# Patient Record
Sex: FEMALE | Race: WHITE | NOT HISPANIC OR LATINO | Employment: FULL TIME | URBAN - METROPOLITAN AREA
[De-identification: names, ages, dates, MRNs, and addresses within clinical notes are randomized per-mention and may not be internally consistent; named-entity substitution may affect disease eponyms.]

---

## 2020-01-23 ENCOUNTER — TELEPHONE (OUTPATIENT)
Dept: INTERNAL MEDICINE | Facility: CLINIC | Age: 30
End: 2020-01-23

## 2020-10-13 LAB
EXTERNAL HEPATITIS B SURFACE ANTIGEN: NEGATIVE
EXTERNAL HEPATITIS C AB: NORMAL
EXTERNAL RUBELLA QUALITATIVE: NORMAL
EXTERNAL SYPHILIS RPR SCREEN: NORMAL

## 2021-04-20 LAB — EXTERNAL GROUP B STREP ANTIGEN: POSITIVE

## 2021-05-13 ENCOUNTER — HOSPITAL ENCOUNTER (INPATIENT)
Facility: HOSPITAL | Age: 31
LOS: 4 days | Discharge: HOME | End: 2021-05-17
Attending: OBSTETRICS & GYNECOLOGY | Admitting: OBSTETRICS & GYNECOLOGY

## 2021-05-13 ENCOUNTER — HOSPITAL ENCOUNTER (OUTPATIENT)
Dept: LABOR AND DELIVERY | Facility: HOSPITAL | Age: 31
Discharge: HOME | End: 2021-05-13

## 2021-05-13 ENCOUNTER — ANESTHESIA EVENT (OUTPATIENT)
Dept: LABOR AND DELIVERY | Facility: HOSPITAL | Age: 31
End: 2021-05-13

## 2021-05-13 ENCOUNTER — ANESTHESIA (OUTPATIENT)
Dept: LABOR AND DELIVERY | Facility: HOSPITAL | Age: 31
End: 2021-05-13

## 2021-05-13 PROBLEM — Z34.90 PREGNANCY: Status: ACTIVE | Noted: 2021-05-13

## 2021-05-13 PROCEDURE — 86900 BLOOD TYPING SEROLOGIC ABO: CPT | Performed by: OBSTETRICS & GYNECOLOGY

## 2021-05-13 PROCEDURE — 86850 RBC ANTIBODY SCREEN: CPT | Performed by: OBSTETRICS & GYNECOLOGY

## 2021-05-13 PROCEDURE — 85027 COMPLETE CBC AUTOMATED: CPT | Performed by: OBSTETRICS & GYNECOLOGY

## 2021-05-13 PROCEDURE — U0003 INFECTIOUS AGENT DETECTION BY NUCLEIC ACID (DNA OR RNA); SEVERE ACUTE RESPIRATORY SYNDROME CORONAVIRUS 2 (SARS-COV-2) (CORONAVIRUS DISEASE [COVID-19]), AMPLIFIED PROBE TECHNIQUE, MAKING USE OF HIGH THROUGHPUT TECHNOLOGIES AS DESCRIBED BY CMS-2020-01-R: HCPCS | Performed by: OBSTETRICS & GYNECOLOGY

## 2021-05-13 PROCEDURE — 86901 BLOOD TYPING SEROLOGIC RH(D): CPT | Performed by: OBSTETRICS & GYNECOLOGY

## 2021-05-13 RX ORDER — MAGNESIUM CARB/ALUMINUM HYDROX 105-160MG
30 TABLET,CHEWABLE ORAL ONCE
Status: DISCONTINUED | OUTPATIENT
Start: 2021-05-14 | End: 2021-05-15 | Stop reason: HOSPADM

## 2021-05-13 RX ORDER — PENICILLIN G 3000000 [IU]/50ML
3 INJECTION, SOLUTION INTRAVENOUS EVERY 4 HOURS
Status: DISCONTINUED | OUTPATIENT
Start: 2021-05-14 | End: 2021-05-15 | Stop reason: HOSPADM

## 2021-05-13 RX ORDER — SODIUM CHLORIDE 0.9 % (FLUSH) 0.9 %
10 SYRINGE (ML) INJECTION AS NEEDED
Status: DISCONTINUED | OUTPATIENT
Start: 2021-05-13 | End: 2021-05-15 | Stop reason: HOSPADM

## 2021-05-13 RX ORDER — OXYTOCIN-SODIUM CHLORIDE 0.9% IV SOLN 30 UNIT/500ML 30-0.9/5 UT/ML-%
2-30 SOLUTION INTRAVENOUS
Status: DISCONTINUED | OUTPATIENT
Start: 2021-05-14 | End: 2021-05-15 | Stop reason: HOSPADM

## 2021-05-13 RX ORDER — PRENATAL VIT NO.126/IRON/FOLIC 28MG-0.8MG
1 TABLET ORAL DAILY
Status: ON HOLD | COMMUNITY

## 2021-05-13 RX ORDER — FAMOTIDINE 10 MG/ML
20 INJECTION, SOLUTION INTRAVENOUS EVERY 12 HOURS PRN
Status: DISCONTINUED | OUTPATIENT
Start: 2021-05-13 | End: 2021-05-15 | Stop reason: HOSPADM

## 2021-05-13 RX ORDER — ONDANSETRON 4 MG/1
4 TABLET, FILM COATED ORAL EVERY 6 HOURS PRN
Status: DISCONTINUED | OUTPATIENT
Start: 2021-05-13 | End: 2021-05-15 | Stop reason: HOSPADM

## 2021-05-13 RX ORDER — SODIUM CHLORIDE, SODIUM LACTATE, POTASSIUM CHLORIDE, CALCIUM CHLORIDE 600; 310; 30; 20 MG/100ML; MG/100ML; MG/100ML; MG/100ML
125 INJECTION, SOLUTION INTRAVENOUS CONTINUOUS
Status: DISCONTINUED | OUTPATIENT
Start: 2021-05-14 | End: 2021-05-15

## 2021-05-13 RX ORDER — FAMOTIDINE 20 MG/1
20 TABLET, FILM COATED ORAL EVERY 12 HOURS PRN
Status: DISCONTINUED | OUTPATIENT
Start: 2021-05-13 | End: 2021-05-15 | Stop reason: HOSPADM

## 2021-05-13 RX ORDER — ONDANSETRON 2 MG/ML
4 INJECTION INTRAMUSCULAR; INTRAVENOUS EVERY 6 HOURS PRN
Status: DISCONTINUED | OUTPATIENT
Start: 2021-05-13 | End: 2021-05-15 | Stop reason: HOSPADM

## 2021-05-13 RX ORDER — TERBUTALINE SULFATE 1 MG/ML
0.25 INJECTION, SOLUTION SUBCUTANEOUS AS NEEDED
Status: DISCONTINUED | OUTPATIENT
Start: 2021-05-13 | End: 2021-05-15 | Stop reason: HOSPADM

## 2021-05-13 RX ADMIN — SODIUM CHLORIDE, POTASSIUM CHLORIDE, SODIUM LACTATE AND CALCIUM CHLORIDE 125 ML/HR: 600; 310; 30; 20 INJECTION, SOLUTION INTRAVENOUS at 23:40

## 2021-05-14 LAB
ABO GROUP BLD: NORMAL
BLD GP AB SCN SERPL QL: NEGATIVE
DEPRECATED RDW RBC AUTO: 44.7 FL (ref 37–54)
ERYTHROCYTE [DISTWIDTH] IN BLOOD BY AUTOMATED COUNT: 12.1 % (ref 12.3–15.4)
HCT VFR BLD AUTO: 33.1 % (ref 34–46.6)
HGB BLD-MCNC: 11.4 G/DL (ref 12–15.9)
MCH RBC QN AUTO: 34.5 PG (ref 26.6–33)
MCHC RBC AUTO-ENTMCNC: 34.4 G/DL (ref 31.5–35.7)
MCV RBC AUTO: 100.3 FL (ref 79–97)
PLATELET # BLD AUTO: 211 10*3/MM3 (ref 140–450)
PMV BLD AUTO: 9.5 FL (ref 6–12)
RBC # BLD AUTO: 3.3 10*6/MM3 (ref 3.77–5.28)
RH BLD: POSITIVE
SARS-COV-2 RNA RESP QL NAA+PROBE: NOT DETECTED
T&S EXPIRATION DATE: NORMAL
WBC # BLD AUTO: 9.99 10*3/MM3 (ref 3.4–10.8)

## 2021-05-14 PROCEDURE — C1755 CATHETER, INTRASPINAL: HCPCS

## 2021-05-14 PROCEDURE — C1755 CATHETER, INTRASPINAL: HCPCS | Performed by: ANESTHESIOLOGY

## 2021-05-14 PROCEDURE — 25010000002 FENTANYL CITRATE (PF) 2500 MCG/50ML SOLUTION: Performed by: ANESTHESIOLOGY

## 2021-05-14 PROCEDURE — 25010000002 PENICILLIN G POTASSIUM PER 600000 UNITS: Performed by: OBSTETRICS & GYNECOLOGY

## 2021-05-14 PROCEDURE — 25010000002 ROPIVACAINE PER 1 MG: Performed by: ANESTHESIOLOGY

## 2021-05-14 PROCEDURE — 25010000002 ROPIVACAINE PER 1 MG: Performed by: STUDENT IN AN ORGANIZED HEALTH CARE EDUCATION/TRAINING PROGRAM

## 2021-05-14 RX ORDER — OXYTOCIN-SODIUM CHLORIDE 0.9% IV SOLN 30 UNIT/500ML 30-0.9/5 UT/ML-%
125 SOLUTION INTRAVENOUS CONTINUOUS PRN
Status: COMPLETED | OUTPATIENT
Start: 2021-05-15 | End: 2021-05-15

## 2021-05-14 RX ORDER — ERYTHROMYCIN 5 MG/G
OINTMENT OPHTHALMIC
Status: DISPENSED
Start: 2021-05-14 | End: 2021-05-15

## 2021-05-14 RX ORDER — PHYTONADIONE 1 MG/.5ML
INJECTION, EMULSION INTRAMUSCULAR; INTRAVENOUS; SUBCUTANEOUS
Status: DISPENSED
Start: 2021-05-14 | End: 2021-05-15

## 2021-05-14 RX ORDER — CARBOPROST TROMETHAMINE 250 UG/ML
250 INJECTION, SOLUTION INTRAMUSCULAR AS NEEDED
Status: DISCONTINUED | OUTPATIENT
Start: 2021-05-14 | End: 2021-05-15 | Stop reason: HOSPADM

## 2021-05-14 RX ORDER — MISOPROSTOL 200 UG/1
800 TABLET ORAL AS NEEDED
Status: DISCONTINUED | OUTPATIENT
Start: 2021-05-14 | End: 2021-05-15 | Stop reason: HOSPADM

## 2021-05-14 RX ORDER — FAMOTIDINE 10 MG/ML
20 INJECTION, SOLUTION INTRAVENOUS ONCE AS NEEDED
Status: DISCONTINUED | OUTPATIENT
Start: 2021-05-14 | End: 2021-05-15 | Stop reason: HOSPADM

## 2021-05-14 RX ORDER — OXYTOCIN-SODIUM CHLORIDE 0.9% IV SOLN 30 UNIT/500ML 30-0.9/5 UT/ML-%
250 SOLUTION INTRAVENOUS CONTINUOUS PRN
Status: ACTIVE | OUTPATIENT
Start: 2021-05-14 | End: 2021-05-15

## 2021-05-14 RX ORDER — OXYTOCIN-SODIUM CHLORIDE 0.9% IV SOLN 30 UNIT/500ML 30-0.9/5 UT/ML-%
999 SOLUTION INTRAVENOUS ONCE
Status: COMPLETED | OUTPATIENT
Start: 2021-05-14 | End: 2021-05-15

## 2021-05-14 RX ORDER — METHYLERGONOVINE MALEATE 0.2 MG/ML
200 INJECTION INTRAVENOUS ONCE AS NEEDED
Status: DISCONTINUED | OUTPATIENT
Start: 2021-05-14 | End: 2021-05-15 | Stop reason: HOSPADM

## 2021-05-14 RX ORDER — EPHEDRINE SULFATE 50 MG/ML
5 INJECTION, SOLUTION INTRAVENOUS AS NEEDED
Status: DISCONTINUED | OUTPATIENT
Start: 2021-05-14 | End: 2021-05-15 | Stop reason: HOSPADM

## 2021-05-14 RX ORDER — DIPHENHYDRAMINE HYDROCHLORIDE 50 MG/ML
12.5 INJECTION INTRAMUSCULAR; INTRAVENOUS EVERY 8 HOURS PRN
Status: DISCONTINUED | OUTPATIENT
Start: 2021-05-14 | End: 2021-05-15 | Stop reason: HOSPADM

## 2021-05-14 RX ORDER — ONDANSETRON 2 MG/ML
4 INJECTION INTRAMUSCULAR; INTRAVENOUS ONCE AS NEEDED
Status: DISCONTINUED | OUTPATIENT
Start: 2021-05-14 | End: 2021-05-15 | Stop reason: HOSPADM

## 2021-05-14 RX ORDER — ROPIVACAINE HYDROCHLORIDE 2 MG/ML
INJECTION, SOLUTION EPIDURAL; INFILTRATION; PERINEURAL AS NEEDED
Status: DISCONTINUED | OUTPATIENT
Start: 2021-05-14 | End: 2021-05-15 | Stop reason: SURG

## 2021-05-14 RX ADMIN — EPHEDRINE SULFATE 5 MG: 50 INJECTION INTRAVENOUS at 15:13

## 2021-05-14 RX ADMIN — SODIUM CHLORIDE, POTASSIUM CHLORIDE, SODIUM LACTATE AND CALCIUM CHLORIDE 999 ML/HR: 600; 310; 30; 20 INJECTION, SOLUTION INTRAVENOUS at 14:02

## 2021-05-14 RX ADMIN — SODIUM CHLORIDE, POTASSIUM CHLORIDE, SODIUM LACTATE AND CALCIUM CHLORIDE 125 ML/HR: 600; 310; 30; 20 INJECTION, SOLUTION INTRAVENOUS at 17:43

## 2021-05-14 RX ADMIN — ROPIVACAINE HYDROCHLORIDE 8 ML: 2 INJECTION, SOLUTION EPIDURAL; INFILTRATION at 14:07

## 2021-05-14 RX ADMIN — PENICILLIN G 3 MILLION UNITS: 3000000 INJECTION, SOLUTION INTRAVENOUS at 20:13

## 2021-05-14 RX ADMIN — EPHEDRINE SULFATE 5 MG: 50 INJECTION INTRAVENOUS at 14:48

## 2021-05-14 RX ADMIN — PENICILLIN G 3 MILLION UNITS: 3000000 INJECTION, SOLUTION INTRAVENOUS at 12:13

## 2021-05-14 RX ADMIN — PENICILLIN G 3 MILLION UNITS: 3000000 INJECTION, SOLUTION INTRAVENOUS at 04:25

## 2021-05-14 RX ADMIN — OXYTOCIN 2 MILLI-UNITS/MIN: 10 INJECTION, SOLUTION INTRAMUSCULAR; INTRAVENOUS at 00:11

## 2021-05-14 RX ADMIN — PENICILLIN G 3 MILLION UNITS: 3000000 INJECTION, SOLUTION INTRAVENOUS at 16:13

## 2021-05-14 RX ADMIN — PENICILLIN G POTASSIUM 5 MILLION UNITS: 5000000 INJECTION, POWDER, FOR SOLUTION INTRAMUSCULAR; INTRAVENOUS at 00:12

## 2021-05-14 RX ADMIN — SODIUM CHLORIDE, POTASSIUM CHLORIDE, SODIUM LACTATE AND CALCIUM CHLORIDE 125 ML/HR: 600; 310; 30; 20 INJECTION, SOLUTION INTRAVENOUS at 08:00

## 2021-05-14 RX ADMIN — PENICILLIN G 3 MILLION UNITS: 3000000 INJECTION, SOLUTION INTRAVENOUS at 08:18

## 2021-05-14 RX ADMIN — FENTANYL CITRATE: 0.05 INJECTION, SOLUTION INTRAMUSCULAR; INTRAVENOUS at 21:49

## 2021-05-14 RX ADMIN — FENTANYL CITRATE 10 ML/HR: 0.05 INJECTION, SOLUTION INTRAMUSCULAR; INTRAVENOUS at 14:07

## 2021-05-15 PROBLEM — Z34.90 PREGNANCY: Status: RESOLVED | Noted: 2021-05-13 | Resolved: 2021-05-15

## 2021-05-15 PROCEDURE — 10907ZC DRAINAGE OF AMNIOTIC FLUID, THERAPEUTIC FROM PRODUCTS OF CONCEPTION, VIA NATURAL OR ARTIFICIAL OPENING: ICD-10-PCS | Performed by: OBSTETRICS & GYNECOLOGY

## 2021-05-15 PROCEDURE — 3E033VJ INTRODUCTION OF OTHER HORMONE INTO PERIPHERAL VEIN, PERCUTANEOUS APPROACH: ICD-10-PCS | Performed by: OBSTETRICS & GYNECOLOGY

## 2021-05-15 PROCEDURE — 0HQ9XZZ REPAIR PERINEUM SKIN, EXTERNAL APPROACH: ICD-10-PCS | Performed by: OBSTETRICS & GYNECOLOGY

## 2021-05-15 RX ORDER — ONDANSETRON 2 MG/ML
4 INJECTION INTRAMUSCULAR; INTRAVENOUS EVERY 6 HOURS PRN
Status: DISCONTINUED | OUTPATIENT
Start: 2021-05-15 | End: 2021-05-17 | Stop reason: HOSPADM

## 2021-05-15 RX ORDER — OXYCODONE AND ACETAMINOPHEN 10; 325 MG/1; MG/1
1 TABLET ORAL EVERY 4 HOURS PRN
Status: DISCONTINUED | OUTPATIENT
Start: 2021-05-15 | End: 2021-05-17 | Stop reason: HOSPADM

## 2021-05-15 RX ORDER — ONDANSETRON 4 MG/1
4 TABLET, FILM COATED ORAL EVERY 8 HOURS PRN
Status: DISCONTINUED | OUTPATIENT
Start: 2021-05-15 | End: 2021-05-17 | Stop reason: HOSPADM

## 2021-05-15 RX ORDER — HYDROXYZINE 50 MG/1
50 TABLET, FILM COATED ORAL NIGHTLY PRN
Status: DISCONTINUED | OUTPATIENT
Start: 2021-05-15 | End: 2021-05-17 | Stop reason: HOSPADM

## 2021-05-15 RX ORDER — DOCUSATE SODIUM 100 MG/1
100 CAPSULE, LIQUID FILLED ORAL 2 TIMES DAILY
Status: DISCONTINUED | OUTPATIENT
Start: 2021-05-15 | End: 2021-05-17 | Stop reason: HOSPADM

## 2021-05-15 RX ORDER — IBUPROFEN 800 MG/1
800 TABLET ORAL EVERY 8 HOURS PRN
Status: DISCONTINUED | OUTPATIENT
Start: 2021-05-15 | End: 2021-05-17 | Stop reason: HOSPADM

## 2021-05-15 RX ORDER — OXYCODONE HYDROCHLORIDE AND ACETAMINOPHEN 5; 325 MG/1; MG/1
1 TABLET ORAL EVERY 4 HOURS PRN
Status: DISCONTINUED | OUTPATIENT
Start: 2021-05-15 | End: 2021-05-17 | Stop reason: HOSPADM

## 2021-05-15 RX ORDER — HYDROCORTISONE 25 MG/G
1 CREAM TOPICAL AS NEEDED
Status: DISCONTINUED | OUTPATIENT
Start: 2021-05-15 | End: 2021-05-17 | Stop reason: HOSPADM

## 2021-05-15 RX ORDER — LANOLIN
CREAM (ML) TOPICAL
Status: DISCONTINUED | OUTPATIENT
Start: 2021-05-15 | End: 2021-05-17 | Stop reason: HOSPADM

## 2021-05-15 RX ORDER — BISACODYL 10 MG
10 SUPPOSITORY, RECTAL RECTAL DAILY PRN
Status: DISCONTINUED | OUTPATIENT
Start: 2021-05-16 | End: 2021-05-17 | Stop reason: HOSPADM

## 2021-05-15 RX ORDER — MISOPROSTOL 200 UG/1
600 TABLET ORAL ONCE AS NEEDED
Status: DISCONTINUED | OUTPATIENT
Start: 2021-05-15 | End: 2021-05-17 | Stop reason: HOSPADM

## 2021-05-15 RX ORDER — CALCIUM CARBONATE 200(500)MG
2 TABLET,CHEWABLE ORAL 3 TIMES DAILY PRN
Status: DISCONTINUED | OUTPATIENT
Start: 2021-05-15 | End: 2021-05-17 | Stop reason: HOSPADM

## 2021-05-15 RX ORDER — PROMETHAZINE HYDROCHLORIDE 12.5 MG/1
12.5 SUPPOSITORY RECTAL EVERY 6 HOURS PRN
Status: DISCONTINUED | OUTPATIENT
Start: 2021-05-15 | End: 2021-05-17 | Stop reason: HOSPADM

## 2021-05-15 RX ORDER — PROMETHAZINE HYDROCHLORIDE 25 MG/1
25 TABLET ORAL EVERY 6 HOURS PRN
Status: DISCONTINUED | OUTPATIENT
Start: 2021-05-15 | End: 2021-05-17 | Stop reason: HOSPADM

## 2021-05-15 RX ADMIN — Medication: at 15:35

## 2021-05-15 RX ADMIN — DOCUSATE SODIUM 100 MG: 100 CAPSULE, LIQUID FILLED ORAL at 21:42

## 2021-05-15 RX ADMIN — OXYTOCIN 999 ML/HR: 10 INJECTION, SOLUTION INTRAMUSCULAR; INTRAVENOUS at 00:40

## 2021-05-15 RX ADMIN — DOCUSATE SODIUM 100 MG: 100 CAPSULE, LIQUID FILLED ORAL at 09:00

## 2021-05-15 RX ADMIN — IBUPROFEN 800 MG: 800 TABLET, FILM COATED ORAL at 02:53

## 2021-05-15 RX ADMIN — OXYTOCIN 125 ML/HR: 10 INJECTION, SOLUTION INTRAMUSCULAR; INTRAVENOUS at 02:25

## 2021-05-15 RX ADMIN — IBUPROFEN 800 MG: 800 TABLET, FILM COATED ORAL at 15:35

## 2021-05-15 NOTE — ANESTHESIA POSTPROCEDURE EVALUATION
"Patient: Marian Saeed    Procedure Summary     Date: 05/14/21 Room / Location:     Anesthesia Start: 1357 Anesthesia Stop: 05/15/21 0037    Procedure: LABOR ANALGESIA Diagnosis:     Scheduled Providers:  Provider: Jana Escobar MD    Anesthesia Type: epidural ASA Status: 2          Anesthesia Type: epidural    Vitals  Vitals Value Taken Time   /66 05/15/21 0445   Temp 36.3 °C (97.4 °F) 05/15/21 0445   Pulse 53 05/15/21 0445   Resp 16 05/15/21 0445   SpO2             Post Anesthesia Care and Evaluation    Patient location during evaluation: bedside  Patient participation: complete - patient participated  Level of consciousness: awake and alert  Pain management: adequate  Airway patency: patent  Anesthetic complications: No anesthetic complications  PONV Status: none  Cardiovascular status: acceptable  Respiratory status: acceptable  Hydration status: acceptable    Comments: /66 (BP Location: Right arm, Patient Position: Sitting)   Pulse 53   Temp 36.3 °C (97.4 °F) (Oral)   Resp 16   Ht 170.2 cm (67\")   Wt 84.2 kg (185 lb 9.6 oz)   SpO2 98%   Breastfeeding Yes   BMI 29.07 kg/m²     No anesthesia care post op    "

## 2021-05-15 NOTE — LACTATION NOTE
P1. Patient has dx of PCOS. Baby has been nursing frequently for good duration per patient , family and clipboard. Baby having frequent stools . Lots of family involvement . FOB supportive. LC number on WB.

## 2021-05-15 NOTE — PLAN OF CARE
Goal Outcome Evaluation:     Progress: improving  Outcome Summary: VSS. Bleeding and fundus WNL. Up to bathroom x1. Breastfeeding well.

## 2021-05-15 NOTE — L&D DELIVERY NOTE
"[facility]  Vaginal Delivery Note    Patient Name: Marian Saeed  :  1990  MRN:  [id number]      Delivery     Delivery: Vaginal, Spontaneous     YOB: 2019    Time of Birth: 12:37 AM      Anesthesia: Epidural     Delivering clinician: Marcela Garcia MD       Infant    Findings: Viable female  infant    Infant observations: Weight: 3609 g (7 lb 15.3 oz)   Observations/Comments:  scale 1      Apgars: 9  @ 1 minute /    9  @ 5 minutes     Placenta, Cord, and Fluid    Placenta delivered  Spontaneous   at  5/15/2021 12:40 AM     Cord: 3 vessels  present.   Cord blood obtained: Yes    Cord gases obtained:  No      Repair    Episiotomy: No   Lacerations: First degree midline    Estimated Blood Loss:  327 mls.          Delivery narrative: The patient is a 30 y.o.  at 39w6d.  Presented for  IOL.  Had slow pit during night.  AROM clear. Epidural given and IUPC placed. Progressed normally to C/C/+1. Labored down an hour. Fetal status reassuring throughout.  of viable female infant  @ 12:37 AM  over an intact perineum. ASDE. 3609 g (7 lb 15.3 oz) .  Placenta delivered spontaneously, intact with 3 vessel cord. Cervix and rectum intact. First degree laceration repaired in usual fashion with 3-0 monocryl suture. Mother and baby recovering good condition. \"Edward\"      Marcela Garcia MD  05/15/21  05:00 EDT    Note written at time of delivery- will refresh for baby data and QBL.  "

## 2021-05-16 LAB
BASOPHILS # BLD AUTO: 0.09 10*3/MM3 (ref 0–0.2)
BASOPHILS NFR BLD AUTO: 0.7 % (ref 0–1.5)
DEPRECATED RDW RBC AUTO: 43.8 FL (ref 37–54)
EOSINOPHIL # BLD AUTO: 0.24 10*3/MM3 (ref 0–0.4)
EOSINOPHIL NFR BLD AUTO: 1.9 % (ref 0.3–6.2)
ERYTHROCYTE [DISTWIDTH] IN BLOOD BY AUTOMATED COUNT: 12.1 % (ref 12.3–15.4)
HCT VFR BLD AUTO: 28.8 % (ref 34–46.6)
HGB BLD-MCNC: 9.7 G/DL (ref 12–15.9)
IMM GRANULOCYTES # BLD AUTO: 0.07 10*3/MM3 (ref 0–0.05)
IMM GRANULOCYTES NFR BLD AUTO: 0.6 % (ref 0–0.5)
LYMPHOCYTES # BLD AUTO: 3.04 10*3/MM3 (ref 0.7–3.1)
LYMPHOCYTES NFR BLD AUTO: 24.4 % (ref 19.6–45.3)
MCH RBC QN AUTO: 33.7 PG (ref 26.6–33)
MCHC RBC AUTO-ENTMCNC: 33.7 G/DL (ref 31.5–35.7)
MCV RBC AUTO: 100 FL (ref 79–97)
MONOCYTES # BLD AUTO: 0.8 10*3/MM3 (ref 0.1–0.9)
MONOCYTES NFR BLD AUTO: 6.4 % (ref 5–12)
NEUTROPHILS NFR BLD AUTO: 66 % (ref 42.7–76)
NEUTROPHILS NFR BLD AUTO: 8.21 10*3/MM3 (ref 1.7–7)
NRBC BLD AUTO-RTO: 0 /100 WBC (ref 0–0.2)
PLATELET # BLD AUTO: 192 10*3/MM3 (ref 140–450)
PMV BLD AUTO: 9.5 FL (ref 6–12)
RBC # BLD AUTO: 2.88 10*6/MM3 (ref 3.77–5.28)
WBC # BLD AUTO: 12.45 10*3/MM3 (ref 3.4–10.8)

## 2021-05-16 PROCEDURE — 85025 COMPLETE CBC W/AUTO DIFF WBC: CPT | Performed by: OBSTETRICS & GYNECOLOGY

## 2021-05-16 RX ADMIN — DOCUSATE SODIUM 100 MG: 100 CAPSULE, LIQUID FILLED ORAL at 20:41

## 2021-05-16 RX ADMIN — IBUPROFEN 800 MG: 800 TABLET, FILM COATED ORAL at 09:26

## 2021-05-16 RX ADMIN — DOCUSATE SODIUM 100 MG: 100 CAPSULE, LIQUID FILLED ORAL at 09:26

## 2021-05-16 RX ADMIN — IBUPROFEN 800 MG: 800 TABLET, FILM COATED ORAL at 00:19

## 2021-05-16 RX ADMIN — BENZOCAINE AND LEVOMENTHOL: 200; 5 SPRAY TOPICAL at 00:19

## 2021-05-16 RX ADMIN — IBUPROFEN 800 MG: 800 TABLET, FILM COATED ORAL at 19:03

## 2021-05-16 NOTE — PLAN OF CARE
Problem: Adult Inpatient Plan of Care  Goal: Plan of Care Review  Outcome: Ongoing, Progressing  Flowsheets (Taken 5/16/2021 0255)  Progress: improving  Plan of Care Reviewed With:   patient   spouse  Outcome Summary: VSS.  Pt ambulating and voiding, WNL.  Breastfeeding.  Pain controlling w/motrin.   Goal Outcome Evaluation:  Plan of Care Reviewed With: patient, spouse  Progress: improving  Outcome Summary: VSS.  Pt ambulating and voiding, WNL.  Breastfeeding.  Pain controlling w/motrin.

## 2021-05-16 NOTE — PROGRESS NOTES
[facility]  Vaginal Delivery Progress Note    Patient Name: Marian Saeed  :  1990  MRN:  [id number]      Subjective   Postpartum Day 1: Vaginal Delivery of a female infant.     The patient feels well without complaints.  Her pain is well controlled.  Reports normal lochia.     The patient plans to breastfeed.    Objective     Vital Signs Range for the last 24 hours  Temperature: Temp:  [97.4 °F (36.3 °C)-99 °F (37.2 °C)] 97.7 °F (36.5 °C)       BP: BP: (101-119)/(65-71) 101/65   Pulse: Heart Rate:  [65-87] 65   Respirations: Resp:  [16-18] 18                       Physical Exam:  General: Awake and alert  Abdomen: Fundus: firm, non tender, 2 below umbilicus  Extremities:  trace edema, NT     Labs:     Results from last 7 days   Lab Units 21  0501 21  2345   WBC 10*3/mm3 12.45* 9.99   HEMOGLOBIN g/dL 9.7* 11.4*   HEMATOCRIT % 28.8* 33.1*   PLATELETS 10*3/mm3 192 211       Prenatal labs results reviewed:  Yes   Rubella: immune  Rh Status:    RH type   Date Value Ref Range Status   2021 Positive  Final         Assessment/Plan  : 1. PPD1 S/P  - Doing well, continue usual cares.           * No active hospital problems. *          Marcela Garcia MD  2021  09:47 EDT

## 2021-05-16 NOTE — LACTATION NOTE
Kristan Leon is a sleepy girl and slightly jaundiced. Patient called  for observation of latch. Baby did take the nipple and got in a few strong sucks but fell asleep and could not be aroused to continue. Practiced hand expression and mom expresses milk easily . Worked on comfortable positioning and  suggested trying side lying position later today. Mom will call. For now drip feeding into baby's mouth .  Lactation Consult Note    Evaluation Completed: 2021 11:52 EDT  Patient Name: Marian Saeed  :  1990  MRN:  [id number]     REFERRAL  INFORMATION:                          Date of Referral: 21   Person Making Referral: patient  Maternal Reason for Referral: breastfeeding currently, no prior breastfeeding experience, other (see comments) (PCOS)  Infant Reason for Referral: hyperbilirubinemia, sleepy    DELIVERY HISTORY:        Skin to skin initiation date/time: 5/15/2021  12:38 AM   Skin to skin end date/time:           MATERNAL ASSESSMENT:  Breast Size Issue: none (21 1145)  Breast Shape: round (21 1145)  Breast Density: filling (21 1145)  Areola: elastic (21 114)  Nipples: everted, graspable (21 114)                INFANT ASSESSMENT:  Information for the patient's :  Justen SaeedFranck [[id number]]   No past medical history on file.                                                                                                     MATERNAL INFANT FEEDING:     Maternal Emotional State: receptive, relaxed (21 1145)  Infant Positioning: cradle (21 1145)   Signs of Milk Transfer: suck/swallow ratio, transfer present (21 114)  Pain with Feeding: yes (21 1145)  Pain Location: breast, right (21 114)  Pain Description: sharp, other (see comments) (brief , with intil latch) (21 114)     Milk Ejection Reflex: present (21 114)           Latch Assistance: verbal guidance offered (21 114)                                EQUIPMENT TYPE:  Breast Pump Type: double electric, personal (05/16/21 8930)                              BREAST PUMPING:          LACTATION REFERRALS:  Lactation Referrals: outpatient lactation program (05/16/21 3869)

## 2021-05-17 VITALS
WEIGHT: 185.6 LBS | SYSTOLIC BLOOD PRESSURE: 112 MMHG | BODY MASS INDEX: 29.13 KG/M2 | DIASTOLIC BLOOD PRESSURE: 74 MMHG | TEMPERATURE: 98.7 F | HEIGHT: 67 IN | HEART RATE: 74 BPM | RESPIRATION RATE: 16 BRPM | OXYGEN SATURATION: 98 %

## 2021-05-17 RX ORDER — IBUPROFEN 800 MG/1
800 TABLET ORAL EVERY 8 HOURS PRN
Qty: 60 TABLET | Refills: 0 | Status: SHIPPED | OUTPATIENT
Start: 2021-05-17 | End: 2023-02-26 | Stop reason: HOSPADM

## 2021-05-17 RX ADMIN — DOCUSATE SODIUM 100 MG: 100 CAPSULE, LIQUID FILLED ORAL at 09:39

## 2021-05-17 RX ADMIN — IBUPROFEN 800 MG: 800 TABLET, FILM COATED ORAL at 10:01

## 2021-05-17 NOTE — DISCHARGE SUMMARY
Date of Discharge:  2021    Discharge Diagnosis: vaginal delivery    Presenting Problem/History of Present Illness  Pregnancy [Z34.90]       Hospital Course  Patient is a 30 y.o. female presented for term IOL.  Delivered viable female infant per Dr. Garcia.  Doing well pp.  Ready for d/c today.      Procedures Performed         Consults:   Consults     No orders found from 2021 to 2021.          Condition on Discharge:   Subjective   Postpartum Day 2 Vaginal Delivery.    The patient feels well without complaints.    Vital Signs  Temp:  [97.8 °F (36.6 °C)-98.7 °F (37.1 °C)] 98.7 °F (37.1 °C)  Heart Rate:  [58-74] 74  Resp:  [16-18] 16  BP: (103-120)/(63-74) 112/74    Physical Exam:   General: Awake and alert   Abdomen: Fundus: firm, non tender    Extremities:  Calves NT bilaterally    Assessment/Plan     PPD2  S/P  -   Stable for discharge. Instructions reviewed      Discharge Disposition  Home or Self Care    Discharge Medications     Discharge Medications      New Medications      Instructions Start Date   ibuprofen 800 MG tablet  Commonly known as: ADVIL,MOTRIN   800 mg, Oral, Every 8 Hours PRN         Continue These Medications      Instructions Start Date   prenatal (CLASSIC) vitamin  tablet  Generic drug: prenatal vitamin   1 tablet, Oral, Daily               The patient has been prescribed a controlled substance.  She has been counseled on the risks associated with using the medication.   The addictive potential of this medication and alternatives were discussed carefully with this patient and she demonstrated understanding.  A NOELLE report has been obtained and reviewed.       Activity at Discharge: restrictions reviewed    Follow-up Appointments  No future appointments.      Test Results Pending at Discharge       Jeanna Linton, GILL  21  09:41 EDT

## 2021-05-17 NOTE — PLAN OF CARE
Goal Outcome Evaluation:  Plan of Care Reviewed With: patient  Progress: improving  Outcome Summary: vss, pain controlled with Motrin, fundus and lochia WNL, voiding spontaneously, ambulate independently, breastfeeding, will continue to monitor.

## 2021-05-20 ENCOUNTER — TELEPHONE (OUTPATIENT)
Dept: LACTATION | Facility: HOSPITAL | Age: 31
End: 2021-05-20

## 2022-08-26 LAB
EXTERNAL HEPATITIS B SURFACE ANTIGEN: NEGATIVE
EXTERNAL HEPATITIS C AB: NORMAL
EXTERNAL RUBELLA QUALITATIVE: NORMAL
EXTERNAL SYPHILIS RPR SCREEN: NORMAL
HIV1 P24 AG SERPL QL IA: NEGATIVE

## 2023-02-26 ENCOUNTER — HOSPITAL ENCOUNTER (EMERGENCY)
Facility: HOSPITAL | Age: 33
Discharge: HOME | End: 2023-02-26
Attending: OBSTETRICS & GYNECOLOGY | Admitting: OBSTETRICS & GYNECOLOGY
Payer: COMMERCIAL

## 2023-02-26 VITALS
SYSTOLIC BLOOD PRESSURE: 118 MMHG | WEIGHT: 169 LBS | HEART RATE: 67 BPM | DIASTOLIC BLOOD PRESSURE: 66 MMHG | OXYGEN SATURATION: 98 % | TEMPERATURE: 97.9 F | BODY MASS INDEX: 26.47 KG/M2 | RESPIRATION RATE: 18 BRPM

## 2023-02-26 LAB
BILIRUB UR QL STRIP: NEGATIVE
CLARITY UR: CLEAR
COLOR UR: YELLOW
GLUCOSE UR STRIP-MCNC: NEGATIVE MG/DL
HGB UR QL STRIP.AUTO: NEGATIVE
KETONES UR QL STRIP: NEGATIVE
LEUKOCYTE ESTERASE UR QL STRIP.AUTO: NEGATIVE
NITRITE UR QL STRIP: NEGATIVE
PH UR STRIP.AUTO: 7 [PH] (ref 5–8)
PROT UR QL STRIP: NEGATIVE
SP GR UR STRIP: <1.005 (ref 1–1.03)
UROBILINOGEN UR QL STRIP: ABNORMAL

## 2023-02-26 PROCEDURE — 81003 URINALYSIS AUTO W/O SCOPE: CPT | Performed by: OBSTETRICS & GYNECOLOGY

## 2023-02-26 PROCEDURE — 59025 FETAL NON-STRESS TEST: CPT

## 2023-02-26 PROCEDURE — 87086 URINE CULTURE/COLONY COUNT: CPT | Performed by: OBSTETRICS & GYNECOLOGY

## 2023-02-26 PROCEDURE — 99283 EMERGENCY DEPT VISIT LOW MDM: CPT | Performed by: OBSTETRICS & GYNECOLOGY

## 2023-02-27 NOTE — NON STRESS TEST
Marian Saeed, a  at 34w3d with an YUNIEL of 2023, by Patient Reported, was seen at [facility] OBSTETRIC EMERGENCY DEPARTMENT for a nonstress test.    Chief Complaint   Patient presents with   • Leg Swelling     Pt reports to [facility] with left leg swelling. Pt noticed swelling around 1900 and states calf felt very hard. Pt elevated leg and states swelling has since gone down slightly. Pt also notes varicose vein on left thigh/around the groin that has been there during the pregnancy but notes that it also appears to be swollen or engorged in appearance. +FM, denies LOF/VB       Patient Active Problem List   Diagnosis   • Migraine with aura and without status migrainosus, not intractable   • Paroxysmal digital cyanosis   • Health care maintenance   • Intermittent palpitations   • Cervicalgia of jptutiag-lriomiv-usvik region   • Cytopenia       Start Time:   Stop Time:     Interpretation A  Nonstress Test Interpretation A: Reactive

## 2023-02-27 NOTE — OBED NOTES
[facility] Provider Note        2023 21:32 DAIANA Saeed is a 32 y.o.  at 34w3d YUNIEL  2023, by Patient Reported who presents for : Leg Swelling (Pt reports to [facility] with left leg swelling. Pt noticed swelling around 1900 and states calf felt very hard. Pt elevated leg and states swelling has since gone down slightly. Pt also notes varicose vein on left thigh/around the groin that has been there during the pregnancy but notes that it also appears to be swollen or engorged in appearance. +FM, denies LOF/VB)          HPI: Patient noticed earlier today that her left calf was swollen and appeared larger than her right.  Her ankles were never swollen.  Since arriving she at the [facility] she now feels that her legs look the same again and the swelling has resolved.    Active fetus Yes   denies ROM  deniescontractions, cramping   denies bleeding    Prenatal care with Babs Roberts MD uncomplicated    Patient Active Problem List    Diagnosis    • Cytopenia [D75.9]    • Migraine with aura and without status migrainosus, not intractable [G43.109]    • Paroxysmal digital cyanosis [I73.00]    • Health care maintenance [Z00.00]    • Intermittent palpitations [R00.2]    • Cervicalgia of blrupoed-slygyey-dxtwq region [M54.2]          POB:   OB History    Para Term  AB Living   2 1 1 0 0 1   SAB IAB Ectopic Molar Multiple Live Births   0 0 0 0 0 1      # Outcome Date GA Lbr Rohit/2nd Weight Sex Delivery Anes PTL Lv   2 Current            1 Term 05/15/21 39w6d 20:05 / 01:27 3609 g (7 lb 15.3 oz) F Vag-Spont EPI N ROSALBA      Birth Comments: scale 1      Name: LOUIS SAEED      Apgar1: 9  Apgar5: 9      PMH:   Past Medical History:   Diagnosis Date   • Migraine    • PCOS (polycystic ovarian syndrome)    • Urticaria, dermatographic      PSH:   Past Surgical History:   Procedure Laterality Date   • KNEE SURGERY      LEFT KNEE  DR. FARNSWORTH   • WISDOM TOOTH EXTRACTION       FH:    Family  "History   Problem Relation Age of Onset   • Thyroid disease Mother    • Migraines Mother    • Hypertension Father    • Atrial fibrillation Father    • Diabetes Paternal Grandmother      SH:   Social History     Tobacco Use   • Smoking status: Never   Substance Use Topics   • Alcohol use: Not Currently   • Drug use: Never       Allergies: Patient has no known allergies.    Medications:   Medications Prior to Admission   Medication Sig Dispense Refill Last Dose   • prenatal vitamin (prenatal, CLASSIC, vitamin) tablet Take 1 tablet by mouth Daily.   2/25/2023   • ibuprofen (ADVIL,MOTRIN) 800 MG tablet Take 1 tablet by mouth Every 8 (Eight) Hours As Needed for Mild Pain . 60 tablet 0        Review of Systems  A 14 system review was completed and negative except for what is noted in the HPI or below  Pertinent items are noted in HPI      Physical exam    Temp:  [97.9 °F (36.6 °C)] 97.9 °F (36.6 °C)  Heart Rate:  [67] 67  Resp:  [18] 18  BP: (118)/(66) 118/66  Estimated body mass index is 26.47 kg/m² as calculated from the following:    Height as of 5/13/21: 170.2 cm (67\").    Weight as of this encounter: 76.7 kg (169 lb).    General appearance - alert, well appearing, and in no distress  Extremities - no edema, redness or tenderness in the calves or thighs           Membranes: Intact                      FHR: Category 1 tracing, reactive NST  CTX: rare     U/S reviewed: not performed.       External Prenatal Results     Pregnancy Outside Results - Transcribed From Office Records - See Scanned Records For Details     Test Value Date Time    ABO  A  05/13/21 2345    Rh  Positive  05/13/21 2345    Antibody Screen  Negative  05/13/21 2345    Varicella IgG       Rubella ^ Immune  10/13/20     Hgb  9.7 g/dL 05/16/21 0501    Hct  28.8 % 05/16/21 0501    Glucose Fasting GTT       Glucose Tolerance Test 1 hour       Glucose Tolerance Test 3 hour       Gonorrhea (discrete)       Chlamydia (discrete)       RPR ^ Non-Reactive  " 10/13/20     VDRL       Syphilis Antibody       HBsAg ^ Negative  10/13/20     Herpes Simplex Virus PCR       Herpes Simplex VIrus Culture       HIV       Hep C RNA Quant PCR       Hep C Antibody ^ Non- Reactive  10/13/20     AFP       Group B Strep ^ Positive  21     GBS Susceptibility to Clindamycin       GBS Susceptibility to Erythromycin       Fetal Fibronectin       Genetic Testing, Maternal Blood             Drug Screening     Test Value Date Time    Urine Drug Screen       Amphetamine Screen       Barbiturate Screen       Benzodiazepine Screen       Methadone Screen       Phencyclidine Screen       Opiates Screen       THC Screen       Cocaine Screen       Propoxyphene Screen       Buprenorphine Screen       Methamphetamine Screen       Oxycodone Screen       Tricyclic Antidepressants Screen             Legend    ^: Historical                          Impression:   @ 34w3d .  Final Diagnosis: edema in left lower extremity, now resolved    Plan:  1. Discharge home.  Patient advised that if she continues to have unilateral edema she should call office or return to [facility] for ultrasound.  2. Plan of care has been reviewed with patient  3.  Risks, benefits of treatment plan have been discussed.  4.  All questions have been answered.  5.  Keep scheduled prenatal appointment with outpatient Ob provider       I discussed the patients findings and my recommendations with patient      Kelley Monreal MD  2023  21:32 EST

## 2023-02-28 LAB — BACTERIA SPEC AEROBE CULT: NO GROWTH

## 2023-03-15 LAB — EXTERNAL GROUP B STREP ANTIGEN: NEGATIVE

## 2023-04-05 ENCOUNTER — ANESTHESIA EVENT (OUTPATIENT)
Dept: LABOR AND DELIVERY | Facility: HOSPITAL | Age: 33
End: 2023-04-05
Payer: COMMERCIAL

## 2023-04-05 ENCOUNTER — HOSPITAL ENCOUNTER (INPATIENT)
Facility: HOSPITAL | Age: 33
LOS: 2 days | Discharge: HOME | End: 2023-04-07
Attending: OBSTETRICS & GYNECOLOGY | Admitting: OBSTETRICS & GYNECOLOGY
Payer: COMMERCIAL

## 2023-04-05 ENCOUNTER — HOSPITAL ENCOUNTER (OUTPATIENT)
Dept: LABOR AND DELIVERY | Facility: HOSPITAL | Age: 33
Discharge: HOME | End: 2023-04-05
Payer: COMMERCIAL

## 2023-04-05 ENCOUNTER — ANESTHESIA (OUTPATIENT)
Dept: LABOR AND DELIVERY | Facility: HOSPITAL | Age: 33
End: 2023-04-05
Payer: COMMERCIAL

## 2023-04-05 DIAGNOSIS — Z34.90 PREGNANCY, UNSPECIFIED GESTATIONAL AGE: ICD-10-CM

## 2023-04-05 LAB
ABO GROUP BLD: NORMAL
BLD GP AB SCN SERPL QL: NEGATIVE
DEPRECATED RDW RBC AUTO: 44.3 FL (ref 37–54)
ERYTHROCYTE [DISTWIDTH] IN BLOOD BY AUTOMATED COUNT: 12.1 % (ref 12.3–15.4)
HCT VFR BLD AUTO: 35.3 % (ref 34–46.6)
HGB BLD-MCNC: 11.9 G/DL (ref 12–15.9)
MCH RBC QN AUTO: 33.6 PG (ref 26.6–33)
MCHC RBC AUTO-ENTMCNC: 33.7 G/DL (ref 31.5–35.7)
MCV RBC AUTO: 99.7 FL (ref 79–97)
PLATELET # BLD AUTO: 196 10*3/MM3 (ref 140–450)
PMV BLD AUTO: 9.7 FL (ref 6–12)
RBC # BLD AUTO: 3.54 10*6/MM3 (ref 3.77–5.28)
RH BLD: POSITIVE
T&S EXPIRATION DATE: NORMAL
WBC NRBC COR # BLD: 9.63 10*3/MM3 (ref 3.4–10.8)

## 2023-04-05 PROCEDURE — 85027 COMPLETE CBC AUTOMATED: CPT | Performed by: OBSTETRICS & GYNECOLOGY

## 2023-04-05 PROCEDURE — 0 LIDOCAINE 1 % SOLUTION: Performed by: ANESTHESIOLOGY

## 2023-04-05 PROCEDURE — 86901 BLOOD TYPING SEROLOGIC RH(D): CPT | Performed by: OBSTETRICS & GYNECOLOGY

## 2023-04-05 PROCEDURE — 86900 BLOOD TYPING SEROLOGIC ABO: CPT | Performed by: OBSTETRICS & GYNECOLOGY

## 2023-04-05 PROCEDURE — 86850 RBC ANTIBODY SCREEN: CPT | Performed by: OBSTETRICS & GYNECOLOGY

## 2023-04-05 PROCEDURE — C1755 CATHETER, INTRASPINAL: HCPCS | Performed by: ANESTHESIOLOGY

## 2023-04-05 RX ORDER — FAMOTIDINE 10 MG/ML
20 INJECTION, SOLUTION INTRAVENOUS ONCE AS NEEDED
Status: COMPLETED | OUTPATIENT
Start: 2023-04-05 | End: 2023-04-06

## 2023-04-05 RX ORDER — FENTANYL CIT 0.2 MG/100ML-ROPIV 0.2%-NACL 0.9% EPIDURAL INJ 2/0.2 MCG/ML-%
10 SOLUTION INJECTION CONTINUOUS
Status: DISCONTINUED | OUTPATIENT
Start: 2023-04-05 | End: 2023-04-06

## 2023-04-05 RX ORDER — LIDOCAINE HYDROCHLORIDE 10 MG/ML
INJECTION, SOLUTION INFILTRATION; PERINEURAL AS NEEDED
Status: DISCONTINUED | OUTPATIENT
Start: 2023-04-05 | End: 2023-04-06 | Stop reason: SURG

## 2023-04-05 RX ORDER — SODIUM CHLORIDE, SODIUM LACTATE, POTASSIUM CHLORIDE, CALCIUM CHLORIDE 600; 310; 30; 20 MG/100ML; MG/100ML; MG/100ML; MG/100ML
125 INJECTION, SOLUTION INTRAVENOUS CONTINUOUS
Status: DISCONTINUED | OUTPATIENT
Start: 2023-04-05 | End: 2023-04-06

## 2023-04-05 RX ORDER — MAGNESIUM CARB/ALUMINUM HYDROX 105-160MG
30 TABLET,CHEWABLE ORAL ONCE
Status: COMPLETED | OUTPATIENT
Start: 2023-04-05 | End: 2023-04-06

## 2023-04-05 RX ORDER — EPHEDRINE SULFATE 50 MG/ML
5 INJECTION, SOLUTION INTRAVENOUS
Status: DISCONTINUED | OUTPATIENT
Start: 2023-04-05 | End: 2023-04-06 | Stop reason: HOSPADM

## 2023-04-05 RX ORDER — DIPHENHYDRAMINE HYDROCHLORIDE 50 MG/ML
12.5 INJECTION INTRAMUSCULAR; INTRAVENOUS EVERY 8 HOURS PRN
Status: DISCONTINUED | OUTPATIENT
Start: 2023-04-05 | End: 2023-04-06 | Stop reason: HOSPADM

## 2023-04-05 RX ORDER — ONDANSETRON 2 MG/ML
4 INJECTION INTRAMUSCULAR; INTRAVENOUS ONCE AS NEEDED
Status: DISCONTINUED | OUTPATIENT
Start: 2023-04-05 | End: 2023-04-06 | Stop reason: HOSPADM

## 2023-04-05 RX ORDER — LIDOCAINE HYDROCHLORIDE AND EPINEPHRINE 15; 5 MG/ML; UG/ML
INJECTION, SOLUTION EPIDURAL AS NEEDED
Status: DISCONTINUED | OUTPATIENT
Start: 2023-04-05 | End: 2023-04-06 | Stop reason: SURG

## 2023-04-05 RX ORDER — SODIUM CHLORIDE 0.9 % (FLUSH) 0.9 %
10 SYRINGE (ML) INJECTION EVERY 12 HOURS SCHEDULED
Status: DISCONTINUED | OUTPATIENT
Start: 2023-04-05 | End: 2023-04-06 | Stop reason: HOSPADM

## 2023-04-05 RX ORDER — SODIUM CHLORIDE 0.9 % (FLUSH) 0.9 %
10 SYRINGE (ML) INJECTION AS NEEDED
Status: DISCONTINUED | OUTPATIENT
Start: 2023-04-05 | End: 2023-04-06 | Stop reason: HOSPADM

## 2023-04-05 RX ORDER — LIDOCAINE HYDROCHLORIDE 10 MG/ML
5 INJECTION, SOLUTION EPIDURAL; INFILTRATION; INTRACAUDAL; PERINEURAL AS NEEDED
Status: DISCONTINUED | OUTPATIENT
Start: 2023-04-05 | End: 2023-04-06 | Stop reason: HOSPADM

## 2023-04-05 RX ORDER — OXYTOCIN/0.9 % SODIUM CHLORIDE 30/500 ML
2-20 PLASTIC BAG, INJECTION (ML) INTRAVENOUS
Status: DISCONTINUED | OUTPATIENT
Start: 2023-04-05 | End: 2023-04-06 | Stop reason: HOSPADM

## 2023-04-05 RX ORDER — TERBUTALINE SULFATE 1 MG/ML
0.25 INJECTION, SOLUTION SUBCUTANEOUS AS NEEDED
Status: DISCONTINUED | OUTPATIENT
Start: 2023-04-05 | End: 2023-04-06 | Stop reason: HOSPADM

## 2023-04-05 RX ORDER — BUTORPHANOL TARTRATE 1 MG/ML
1 INJECTION, SOLUTION INTRAMUSCULAR; INTRAVENOUS
Status: DISCONTINUED | OUTPATIENT
Start: 2023-04-05 | End: 2023-04-06 | Stop reason: HOSPADM

## 2023-04-05 RX ADMIN — SODIUM CHLORIDE, POTASSIUM CHLORIDE, SODIUM LACTATE AND CALCIUM CHLORIDE 125 ML/HR: 600; 310; 30; 20 INJECTION, SOLUTION INTRAVENOUS at 16:01

## 2023-04-05 RX ADMIN — Medication 12 ML/HR: at 21:26

## 2023-04-05 RX ADMIN — SODIUM CHLORIDE, POTASSIUM CHLORIDE, SODIUM LACTATE AND CALCIUM CHLORIDE 999 ML/HR: 600; 310; 30; 20 INJECTION, SOLUTION INTRAVENOUS at 21:19

## 2023-04-05 RX ADMIN — LIDOCAINE HYDROCHLORIDE 5 ML: 10 INJECTION, SOLUTION INFILTRATION; PERINEURAL at 21:23

## 2023-04-05 RX ADMIN — LIDOCAINE HYDROCHLORIDE AND EPINEPHRINE 3 ML: 15; 5 INJECTION, SOLUTION EPIDURAL at 21:19

## 2023-04-05 RX ADMIN — Medication 2 MILLI-UNITS/MIN: at 16:42

## 2023-04-05 NOTE — H&P
.[facility]  Obstetric History and Physical    No chief complaint on file.      Subjective     Patient is a 32 y.o. female  currently at 39w6d risk reducing pit induction.            PROBLEM LIST    Pregnancy      Past OB History:       OB History    Para Term  AB Living   2 1 1 0 0 1   SAB IAB Ectopic Molar Multiple Live Births   0 0 0 0 0 1      # Outcome Date GA Lbr Rohit/2nd Weight Sex Delivery Anes PTL Lv   2 Current            1 Term 05/15/21 39w6d 20:05 / :27 3609 g (7 lb 15.3 oz) F Vag-Spont EPI N ROSALBA      Birth Comments: scale 1      Name: LOUIS RENAE      Apgar1: 9  Apgar5: 9       Past Medical History: Past Medical History:   Diagnosis Date   • Migraine    • PCOS (polycystic ovarian syndrome)    • Urticaria, dermatographic       Past Surgical History Past Surgical History:   Procedure Laterality Date   • KNEE SURGERY      LEFT KNEE  DR. FARNSWORTH   • WISDOM TOOTH EXTRACTION        Family History: Family History   Problem Relation Age of Onset   • Thyroid disease Mother    • Migraines Mother    • Hypertension Father    • Atrial fibrillation Father    • Diabetes Paternal Grandmother       Social History:  reports that she has never smoked. She does not have any smokeless tobacco history on file.   reports that she does not currently use alcohol.   reports no history of drug use.    Allergies:     Patient has no known allergies.       Objective       Vital Signs Range for the last 24 hours  Temperature: Temp:  [98.1 °F (36.7 °C)-98.4 °F (36.9 °C)] 98.1 °F (36.7 °C)   Temp Source: Temp src: Oral   BP: BP: (103-117)/(65-86) 103/65   Pulse: Heart Rate:  [57-74] 57   Respirations: Resp:  [16-18] 16                   Physical Examination:     General :  Alert in NAD  Abdomen: Gravid, nontender        Cervix: Exam by: Method: sterile exam per physician   Dilation: Cervical Dilation (cm): 3   Effacement: Cervical Effacement: 60%   Station: Fetal Station: -2       Fetal Heart Rate  Assessment   Method: Fetal HR Assessment Method: external   Beats/min: Fetal HR (beats/min): 120   Baseline: Fetal HR Baseline: normal range   Varibility: Fetal HR Variability: moderate (amplitude range 6 to 25 bpm)   Accels: Fetal HR Accelerations: greater than/equal to 15 bpm, lasting at least 15 seconds   Decels: Fetal HR Decelerations: absent   Tracing Category:       Uterine Assessment   Method: Method: external tocotransducer   Frequency (min): Contraction Frequency (Minutes): 0   Ctx Count in 10 min:     Duration:     Intensity:     Intensity by IUPC:     Resting Tone:     Resting Tone by IUPC:     [place] Units:         cvx- 3/60/-2. AROM        Assessment & Plan       Assessment:  1.  Intrauterine pregnancy at 39w6d weeks gestation with reassuring fetal status.    2.  Risk reducing pit induction    Plan:   Plan of care has been reviewed with patient and family,.   All questions answered.          Office prenatal reviewed        Jenelle Rae MD  4/5/2023  19:11 EDT

## 2023-04-06 LAB
ATMOSPHERIC PRESS: 751.6 MMHG
BASE EXCESS BLDCOA CALC-SCNC: -1 MMOL/L (ref -2–2)
BDY SITE: ABNORMAL
HCO3 BLDCOA-SCNC: 27.2 MMOL/L (ref 22–28)
INHALED O2 CONCENTRATION: 21 %
MODALITY: ABNORMAL
NOTE: ABNORMAL
PCO2 BLDCOA: 59.7 MMHG (ref 43–63)
PH BLDCOA: 7.27 PH UNITS (ref 7.18–7.34)
PO2 BLDCOA: 22.1 MMHG (ref 12–26)
SAO2 % BLDCOA: 29.2 % (ref 92–99)
VENTILATOR MODE: ABNORMAL

## 2023-04-06 PROCEDURE — 25010000002 METHYLERGONOVINE MALEATE PER 0.2 MG

## 2023-04-06 PROCEDURE — 82803 BLOOD GASES ANY COMBINATION: CPT

## 2023-04-06 PROCEDURE — 3E033VJ INTRODUCTION OF OTHER HORMONE INTO PERIPHERAL VEIN, PERCUTANEOUS APPROACH: ICD-10-PCS | Performed by: OBSTETRICS & GYNECOLOGY

## 2023-04-06 RX ORDER — IBUPROFEN 600 MG/1
600 TABLET ORAL EVERY 6 HOURS PRN
Status: DISCONTINUED | OUTPATIENT
Start: 2023-04-06 | End: 2023-04-06 | Stop reason: HOSPADM

## 2023-04-06 RX ORDER — HYDROCODONE BITARTRATE AND ACETAMINOPHEN 10; 325 MG/1; MG/1
1 TABLET ORAL EVERY 4 HOURS PRN
Status: DISCONTINUED | OUTPATIENT
Start: 2023-04-06 | End: 2023-04-07 | Stop reason: HOSPADM

## 2023-04-06 RX ORDER — MISOPROSTOL 200 UG/1
TABLET ORAL
Status: COMPLETED
Start: 2023-04-06 | End: 2023-04-06

## 2023-04-06 RX ORDER — HYDROXYZINE 50 MG/1
50 TABLET, FILM COATED ORAL NIGHTLY PRN
Status: DISCONTINUED | OUTPATIENT
Start: 2023-04-06 | End: 2023-04-07 | Stop reason: HOSPADM

## 2023-04-06 RX ORDER — HYDROCORTISONE 25 MG/G
1 CREAM TOPICAL AS NEEDED
Status: DISCONTINUED | OUTPATIENT
Start: 2023-04-06 | End: 2023-04-07 | Stop reason: HOSPADM

## 2023-04-06 RX ORDER — BISACODYL 10 MG
10 SUPPOSITORY, RECTAL RECTAL DAILY PRN
Status: DISCONTINUED | OUTPATIENT
Start: 2023-04-07 | End: 2023-04-07 | Stop reason: HOSPADM

## 2023-04-06 RX ORDER — PROMETHAZINE HYDROCHLORIDE 25 MG/1
25 TABLET ORAL EVERY 6 HOURS PRN
Status: DISCONTINUED | OUTPATIENT
Start: 2023-04-06 | End: 2023-04-07 | Stop reason: HOSPADM

## 2023-04-06 RX ORDER — METHYLERGONOVINE MALEATE 0.2 MG/ML
INJECTION INTRAVENOUS
Status: COMPLETED
Start: 2023-04-06 | End: 2023-04-06

## 2023-04-06 RX ORDER — ACETAMINOPHEN 325 MG/1
650 TABLET ORAL EVERY 6 HOURS PRN
Status: DISCONTINUED | OUTPATIENT
Start: 2023-04-06 | End: 2023-04-07 | Stop reason: HOSPADM

## 2023-04-06 RX ORDER — OXYTOCIN/0.9 % SODIUM CHLORIDE 30/500 ML
999 PLASTIC BAG, INJECTION (ML) INTRAVENOUS ONCE
Status: COMPLETED | OUTPATIENT
Start: 2023-04-06 | End: 2023-04-06

## 2023-04-06 RX ORDER — ONDANSETRON 2 MG/ML
4 INJECTION INTRAMUSCULAR; INTRAVENOUS EVERY 6 HOURS PRN
Status: DISCONTINUED | OUTPATIENT
Start: 2023-04-06 | End: 2023-04-07 | Stop reason: HOSPADM

## 2023-04-06 RX ORDER — ONDANSETRON 4 MG/1
4 TABLET, FILM COATED ORAL EVERY 8 HOURS PRN
Status: DISCONTINUED | OUTPATIENT
Start: 2023-04-06 | End: 2023-04-07 | Stop reason: HOSPADM

## 2023-04-06 RX ORDER — IBUPROFEN 600 MG/1
600 TABLET ORAL EVERY 6 HOURS PRN
Status: DISCONTINUED | OUTPATIENT
Start: 2023-04-06 | End: 2023-04-07 | Stop reason: HOSPADM

## 2023-04-06 RX ORDER — CALCIUM CARBONATE 200(500)MG
2 TABLET,CHEWABLE ORAL 3 TIMES DAILY PRN
Status: DISCONTINUED | OUTPATIENT
Start: 2023-04-06 | End: 2023-04-07 | Stop reason: HOSPADM

## 2023-04-06 RX ORDER — MISOPROSTOL 200 UG/1
600 TABLET ORAL ONCE AS NEEDED
Status: DISCONTINUED | OUTPATIENT
Start: 2023-04-06 | End: 2023-04-07 | Stop reason: HOSPADM

## 2023-04-06 RX ORDER — OXYTOCIN/0.9 % SODIUM CHLORIDE 30/500 ML
250 PLASTIC BAG, INJECTION (ML) INTRAVENOUS CONTINUOUS
Status: DISCONTINUED | OUTPATIENT
Start: 2023-04-06 | End: 2023-04-06

## 2023-04-06 RX ORDER — PROMETHAZINE HYDROCHLORIDE 12.5 MG/1
12.5 SUPPOSITORY RECTAL EVERY 6 HOURS PRN
Status: DISCONTINUED | OUTPATIENT
Start: 2023-04-06 | End: 2023-04-07 | Stop reason: HOSPADM

## 2023-04-06 RX ORDER — DOCUSATE SODIUM 100 MG/1
100 CAPSULE, LIQUID FILLED ORAL 2 TIMES DAILY
Status: DISCONTINUED | OUTPATIENT
Start: 2023-04-06 | End: 2023-04-07 | Stop reason: HOSPADM

## 2023-04-06 RX ORDER — ACETAMINOPHEN 325 MG/1
650 TABLET ORAL EVERY 4 HOURS PRN
Status: DISCONTINUED | OUTPATIENT
Start: 2023-04-06 | End: 2023-04-06 | Stop reason: HOSPADM

## 2023-04-06 RX ORDER — HYDROCODONE BITARTRATE AND ACETAMINOPHEN 5; 325 MG/1; MG/1
1 TABLET ORAL EVERY 4 HOURS PRN
Status: DISCONTINUED | OUTPATIENT
Start: 2023-04-06 | End: 2023-04-07 | Stop reason: HOSPADM

## 2023-04-06 RX ADMIN — IBUPROFEN 600 MG: 600 TABLET, FILM COATED ORAL at 08:10

## 2023-04-06 RX ADMIN — DOCUSATE SODIUM 100 MG: 100 CAPSULE, LIQUID FILLED ORAL at 20:20

## 2023-04-06 RX ADMIN — IBUPROFEN 600 MG: 600 TABLET, FILM COATED ORAL at 20:20

## 2023-04-06 RX ADMIN — Medication 250 ML/HR: at 01:45

## 2023-04-06 RX ADMIN — MINERAL OIL 30 ML: 1000 SOLUTION ORAL at 01:15

## 2023-04-06 RX ADMIN — METHYLERGONOVINE MALEATE 200 MCG: 0.2 INJECTION INTRAVENOUS at 01:35

## 2023-04-06 RX ADMIN — Medication 999 ML/HR: at 01:23

## 2023-04-06 RX ADMIN — IBUPROFEN 600 MG: 600 TABLET, FILM COATED ORAL at 02:37

## 2023-04-06 RX ADMIN — MISOPROSTOL 800 MCG: 200 TABLET ORAL at 01:35

## 2023-04-06 RX ADMIN — SODIUM CHLORIDE, POTASSIUM CHLORIDE, SODIUM LACTATE AND CALCIUM CHLORIDE 125 ML/HR: 600; 310; 30; 20 INJECTION, SOLUTION INTRAVENOUS at 00:39

## 2023-04-06 RX ADMIN — ACETAMINOPHEN 650 MG: 325 TABLET ORAL at 02:37

## 2023-04-06 RX ADMIN — DOCUSATE SODIUM 100 MG: 100 CAPSULE, LIQUID FILLED ORAL at 08:10

## 2023-04-06 RX ADMIN — FAMOTIDINE 20 MG: 10 INJECTION INTRAVENOUS at 00:55

## 2023-04-06 RX ADMIN — IBUPROFEN 600 MG: 600 TABLET, FILM COATED ORAL at 14:12

## 2023-04-06 NOTE — ANESTHESIA POSTPROCEDURE EVALUATION
Patient: Marian Saeed    Procedure Summary     Date: 04/05/23 Room / Location:     Anesthesia Start: 2112 Anesthesia Stop: 04/06/23 0121    Procedure: LABOR ANALGESIA Diagnosis:     Scheduled Providers:  Provider: Isidro Bourne MD    Anesthesia Type: epidural ASA Status: 2          Anesthesia Type: epidural    Vitals  Vitals Value Taken Time   /91 04/06/23 0200   Temp 36.8 °C (98.2 °F) 04/06/23 0130   Pulse 80 04/06/23 0200   Resp 16 04/06/23 0200   SpO2 98 % 04/06/23 0115   Vitals shown include unvalidated device data.        Post Anesthesia Care and Evaluation      Comments: An anesthesiologist was immediately available throughout the labor and delivery period.

## 2023-04-06 NOTE — L&D DELIVERY NOTE
[facility]  Vaginal Delivery Note    Delivery    Marian Saeed 32 y.o.  at 40w0d    Induction: Oxytocin   Induction indication:  Risk reducing  Labor onset:2023  5:53 PM   Dilation complete: 2023  1:13 AM   Beginning of second stage: 2023  1:17 AM     Antibiotics received during labor: No            Delivery: Vaginal, Spontaneous     YOB: 2021    Time of Birth: 1:21 AM      Anesthesia: Epidural     Delivering clinician: Jenelle Rae MD       Infant    Findings: Viable female  infant    Infant observations: Weight: 3185 g (7 lb 0.4 oz)    Observations/Comments:  infant scale #1      Apgars: 8  @ 1 minute /    9  @ 5 minutes     Placenta, Cord, and Fluid    Placenta delivered  Spontaneous   at  2023  1:25 AM     Cord: 3 vessels  present.   Cord blood obtained: Yes    Cord gases obtained:  Yes      Repair    Episiotomy: none   Lacerations: none   Estimated Blood Loss:  mls.       Delivery narrative: The patient is a 32 y.o.  at 40w0d. Risk reducing pit induction. epidudral Membrane rupture/fluid: artificial rupture of membranes;Intact  at 5:53 PM  on 2023  Clear  She progressed appropriately in labor. FHR were overall reassuring without persistent worrisome decelerations.  SheProgressed to complete at 1:13 AM . Labored down until 2023  1:17 AM . She pushed couple contractions and had a   of a  3185 g (7 lb 0.4 oz)  female   infant   8  @ 1 minute /   9  @ 5 minutes. The right shoulder was the anterior shoulder and easily delivered. Arterial was pH sent.    Placenta was spontaneously delivered,3 vessel cord, intact. . Cervix and rectum intact. There was no laceration. QBL was 163  mls. There were no complications. Mother and baby doing well at time of dictation.    Seemed to be mildly boggy that would firm up good with fundal massage. Gave cytotec 800mcg and methergine more prophylactically.          Pregnancy      Jenelle Rae MD  23  09:28  EDT    Delivery note completed now- 1:47 AM EDT  too soon after delivery that nursing info not yet entered. Refreshing later so missing delivery demographics recorded.

## 2023-04-06 NOTE — ANESTHESIA PREPROCEDURE EVALUATION
Anesthesia Evaluation                  Airway   Mallampati: II  TM distance: >3 FB  Neck ROM: full  Dental - normal exam     Pulmonary - normal exam   Cardiovascular - normal exam        Neuro/Psych  GI/Hepatic/Renal/Endo      Musculoskeletal     Abdominal    Substance History      OB/GYN    (+) Pregnant,         Other                          Anesthesia Plan    ASA 2     epidural       Anesthetic plan, risks, benefits, and alternatives have been provided, discussed and informed consent has been obtained with: patient and spouse/significant other.

## 2023-04-06 NOTE — PLAN OF CARE
Goal Outcome Evaluation:  Plan of Care Reviewed With: patient, spouse        Progress: improving  Outcome Evaluation: 5/80/-1, anticipate vaginal delivery

## 2023-04-06 NOTE — ANESTHESIA PROCEDURE NOTES
Labor Epidural      Patient reassessed immediately prior to procedure    Patient location during procedure: OB  Performed By  Anesthesiologist: Isidro Bourne MD  Preanesthetic Checklist  Completed: patient identified, IV checked, site marked, risks and benefits discussed, surgical consent, monitors and equipment checked, pre-op evaluation and timeout performed  Additional Notes  Test dose 3 cc 1.5% lidocaine with epi.  Second dose 5cc 1% lidocaine then continuous infusion o.2% Ropivicaine with 2 Mics fentanyl per cc at 12 cc hr, with  6cc PCA, 15 min lockout  Prep:  Pt Position:sitting  Sterile Tech:gloves, cap, mask and sterile barrier  Prep:chlorhexidine gluconate and isopropyl alcohol  Monitoring:blood pressure monitoring, continuous pulse oximetry and EKG  Epidural Block Procedure:  Approach:midline  Guidance:landmark technique  Location:L4-L5  Needle Type:Tuohy  Needle Gauge:18  Loss of Resistance Medium: air  Paresthesia: none  Aspiration:negative  Test Dose:negative  Number of Attempts: 1  Post Assessment:  Dressing:occlusive dressing applied and secured with tape  Pt Tolerance:patient tolerated the procedure well with no apparent complications  Complications:no

## 2023-04-06 NOTE — LACTATION NOTE
P2T. Baby girl nursing well at left breast in cradle hold. Patient denies discomfort and nursed first baby with no problems . Has PBP. LC # on WB.  Lactation Consult Note    Evaluation Completed: 2023 11:00 EDT  Patient Name: Marian Saeed  :  1990  MRN:  [id number]     REFERRAL  INFORMATION:                          Date of Referral: 23   Person Making Referral: lactation consultant  Maternal Reason for Referral: breastfeeding currently       DELIVERY HISTORY:        Skin to skin initiation date/time: 2023  1:22 AM   Skin to skin end date/time: 2023  2:50 AM        MATERNAL ASSESSMENT:  Breast Size Issue: none (23 1000)  Breast Shape: round, pendulous (23 1000)  Breast Density: soft (23 1000)     Nipples: everted (23 1000)     Left Nipple Symptoms: intact (23 1000)  Right Nipple Symptoms: intact (23 1000)       INFANT ASSESSMENT:  Information for the patient's :  Felicita Saeed [[id number]]   No past medical history on file.                                                                                                     MATERNAL INFANT FEEDING:     Maternal Emotional State: receptive, relaxed (23 1000)  Infant Positioning: cradle (23 1000)   Signs of Milk Transfer: suck/swallow ratio, transfer present (23 1000)  Pain with Feeding: no (23 1000)                       Latch Assistance: none needed (23 1000)                               EQUIPMENT TYPE:  Breast Pump Type: double electric, personal (23 1000)                              BREAST PUMPING:          LACTATION REFERRALS:

## 2023-04-07 VITALS
DIASTOLIC BLOOD PRESSURE: 63 MMHG | TEMPERATURE: 97.9 F | HEIGHT: 67 IN | RESPIRATION RATE: 16 BRPM | WEIGHT: 169 LBS | OXYGEN SATURATION: 99 % | BODY MASS INDEX: 26.53 KG/M2 | HEART RATE: 44 BPM | SYSTOLIC BLOOD PRESSURE: 107 MMHG

## 2023-04-07 LAB
BASOPHILS # BLD AUTO: 0.09 10*3/MM3 (ref 0–0.2)
BASOPHILS NFR BLD AUTO: 1.3 % (ref 0–1.5)
DEPRECATED RDW RBC AUTO: 42.6 FL (ref 37–54)
EOSINOPHIL # BLD AUTO: 0.17 10*3/MM3 (ref 0–0.4)
EOSINOPHIL NFR BLD AUTO: 2.4 % (ref 0.3–6.2)
ERYTHROCYTE [DISTWIDTH] IN BLOOD BY AUTOMATED COUNT: 11.8 % (ref 12.3–15.4)
HCT VFR BLD AUTO: 30.3 % (ref 34–46.6)
HGB BLD-MCNC: 10.2 G/DL (ref 12–15.9)
IMM GRANULOCYTES # BLD AUTO: 0.01 10*3/MM3 (ref 0–0.05)
IMM GRANULOCYTES NFR BLD AUTO: 0.1 % (ref 0–0.5)
LYMPHOCYTES # BLD AUTO: 3.08 10*3/MM3 (ref 0.7–3.1)
LYMPHOCYTES NFR BLD AUTO: 44.2 % (ref 19.6–45.3)
MCH RBC QN AUTO: 34 PG (ref 26.6–33)
MCHC RBC AUTO-ENTMCNC: 33.7 G/DL (ref 31.5–35.7)
MCV RBC AUTO: 101 FL (ref 79–97)
MONOCYTES # BLD AUTO: 0.47 10*3/MM3 (ref 0.1–0.9)
MONOCYTES NFR BLD AUTO: 6.7 % (ref 5–12)
NEUTROPHILS NFR BLD AUTO: 3.15 10*3/MM3 (ref 1.7–7)
NEUTROPHILS NFR BLD AUTO: 45.3 % (ref 42.7–76)
NRBC BLD AUTO-RTO: 0 /100 WBC (ref 0–0.2)
PLATELET # BLD AUTO: 157 10*3/MM3 (ref 140–450)
PMV BLD AUTO: 10.3 FL (ref 6–12)
RBC # BLD AUTO: 3 10*6/MM3 (ref 3.77–5.28)
WBC NRBC COR # BLD: 6.97 10*3/MM3 (ref 3.4–10.8)

## 2023-04-07 PROCEDURE — 85025 COMPLETE CBC W/AUTO DIFF WBC: CPT | Performed by: OBSTETRICS & GYNECOLOGY

## 2023-04-07 RX ORDER — IBUPROFEN 600 MG/1
600 TABLET ORAL EVERY 8 HOURS PRN
Qty: 40 TABLET | Refills: 0 | Status: SHIPPED | OUTPATIENT
Start: 2023-04-07

## 2023-04-07 RX ADMIN — IBUPROFEN 600 MG: 600 TABLET, FILM COATED ORAL at 05:29

## 2023-04-07 RX ADMIN — DOCUSATE SODIUM 100 MG: 100 CAPSULE, LIQUID FILLED ORAL at 08:39

## 2023-04-07 NOTE — DISCHARGE SUMMARY
[facility]  Vaginal Delivery Progress Note    Subjective   Postpartum Day 1Vaginal Delivery.    The patient feels well without complaints. Ready to go home.    Wants to go home today.    Nl lochia  Just wants motrin    Objective     Vital Signs Range for the last 24 hours  Temperature: Temp:  [97.1 °F (36.2 °C)-97.9 °F (36.6 °C)] 97.9 °F (36.6 °C)       BP: BP: ()/(52-68) 107/63   Pulse: Heart Rate:  [44-54] 44   Respirations: Resp:  [16-18] 16                       Physical Exam:  General: Awake and alert  Abdomen: Fundus: firm, non tender, and below umbilicus  Extremities:  Calves NT bilaterally        Assessment & Plan     PPD2  S/P  - routine care. Discharge home. Instructions reviewed.   Rx sent/on chart.  Follow up in 6 weeks.     PROBLEM LIST    Pregnancy        Jenelle Rae MD  2023  11:41 EDT

## 2023-04-07 NOTE — PAYOR COMM NOTE
"Christophe Marian (32 y.o. Female)     [facility]    [address] [place] [address]  Facility NPI: [id number]    Dhaval Lyons  Fax: [phone]  Phone: [phone] (Muna: 3365)    Subject: ADMISSION NOTIFICATION AUTH CLINICALS   Reference #: [id number]  Please don't hesitate to contact me with any questions or concerns.          Date of Birth   1990    Social Security Number       [place]   [address] [place] [address] [facility] [address]    Home Phone   [phone]    MRN   [id number]       Islam   None    Marital Status                               Admission Date   4/5/23    Admission Type   Elective    Admitting Provider   Jenelle Rae MD    Attending Provider   Babs Roberts MD    Department, Room/Bed   Psychiatric [facility], S308/1       Discharge Date       Discharge Disposition       Discharge Destination                               Attending Provider: Babs Roberts MD    Allergies: No Known Allergies    Isolation: None   Infection: None   Code Status: CPR    Ht: 170.2 cm (67\")   Wt: 76.7 kg (169 lb)    Admission Cmt: None   Principal Problem: None                Active Insurance as of 4/5/2023     Primary Coverage     Payor Plan Insurance Group Employer/Plan Group    ANTHEM BLUE CROSS ANTHEM BLUE CROSS BLUE SHIELD PPO [id number]     Payor Plan Address Payor Plan Phone Number Payor Plan Fax Number Effective Dates    [address] [phone]  1/1/2021 - None Entered    [facility] [id number]       Subscriber Name Subscriber Birth Date Member ID       JAYDEN SAEED 2/2/1993 [id number]                 Emergency Contacts      (Rel.) Home Phone Work Phone Mobile Phone    ALE NAVAS (Mother) [phone] -- --    Christophe Adalberto (Spouse) -- -- [phone]            Insurance Information                ANTHEM BLUE CROSS/ANTHEM BLUE CROSS BLUE SHIELD PPO Phone: [phone]    Subscriber: Jayden Saeed Subscriber#: [id number] "    Group#: [id number] Precert#: --             History & Physical      Jenelle Rae MD at 23          .[facility]  Obstetric History and Physical    No chief complaint on file.      Subjective      Patient is a 32 y.o. female  currently at 39w6d risk reducing pit induction.            PROBLEM LIST    Pregnancy      Past OB History:       OB History    Para Term  AB Living   2 1 1 0 0 1   SAB IAB Ectopic Molar Multiple Live Births   0 0 0 0 0 1      # Outcome Date GA Lbr Rohit/2nd Weight Sex Delivery Anes PTL Lv   2 Current            1 Term 05/15/21 39w6d 20:05  01:27 3609 g (7 lb 15.3 oz) F Vag-Spont EPI N ROSALBA      Birth Comments: scale 1      Name: LOUIS RENAE      Apgar1: 9  Apgar5: 9       Past Medical History: Past Medical History:   Diagnosis Date   • Migraine    • PCOS (polycystic ovarian syndrome)    • Urticaria, dermatographic       Past Surgical History Past Surgical History:   Procedure Laterality Date   • KNEE SURGERY      LEFT KNEE  DR. FARNSWORTH   • WISDOM TOOTH EXTRACTION        Family History: Family History   Problem Relation Age of Onset   • Thyroid disease Mother    • Migraines Mother    • Hypertension Father    • Atrial fibrillation Father    • Diabetes Paternal Grandmother       Social History:  reports that she has never smoked. She does not have any smokeless tobacco history on file.   reports that she does not currently use alcohol.   reports no history of drug use.    Allergies:     Patient has no known allergies.       Objective        Vital Signs Range for the last 24 hours  Temperature: Temp:  [98.1 °F (36.7 °C)-98.4 °F (36.9 °C)] 98.1 °F (36.7 °C)   Temp Source: Temp src: Oral   BP: BP: (103-117)/(65-86) 103/65   Pulse: Heart Rate:  [57-74] 57   Respirations: Resp:  [16-18] 16                   Physical Examination:     General :  Alert in NAD  Abdomen: Gravid, nontender        Cervix: Exam by: Method: sterile exam per physician   Dilation:  Cervical Dilation (cm): 3   Effacement: Cervical Effacement: 60%   Station: Fetal Station: -2       Fetal Heart Rate Assessment   Method: Fetal HR Assessment Method: external   Beats/min: Fetal HR (beats/min): 120   Baseline: Fetal HR Baseline: normal range   Varibility: Fetal HR Variability: moderate (amplitude range 6 to 25 bpm)   Accels: Fetal HR Accelerations: greater than/equal to 15 bpm, lasting at least 15 seconds   Decels: Fetal HR Decelerations: absent   Tracing Category:       Uterine Assessment   Method: Method: external tocotransducer   Frequency (min): Contraction Frequency (Minutes): 0   Ctx Count in 10 min:     Duration:     Intensity:     Intensity by IUPC:     Resting Tone:     Resting Tone by IUPC:     [place] Units:         cvx- 3/60/-2. AROM        Assessment & Plan       Assessment:  1.  Intrauterine pregnancy at 39w6d weeks gestation with reassuring fetal status.    2.  Risk reducing pit induction    Plan:   Plan of care has been reviewed with patient and family,.   All questions answered.          Office prenatal reviewed        Jenelle Rae MD  4/5/2023  19:11 EDT        Electronically signed by Jenelle Rae MD at 04/05/23 1912         Facility-Administered Medications as of 4/7/2023   Medication Dose Route Frequency Provider Last Rate Last Admin   • acetaminophen (TYLENOL) tablet 650 mg  650 mg Oral Q6H PRN Jenelle Rae MD       • benzocaine (AMERICAINE) 20 % rectal ointment 1 application  1 application Rectal PRN Jenelle Rae MD       • benzocaine-menthol (DERMOPLAST) 20-0.5 % topical spray   Topical PRN Jenelle Rae MD       • bisacodyl (DULCOLAX) suppository 10 mg  10 mg Rectal Daily PRN Jenelle Rae MD       • calcium carbonate (TUMS) chewable tablet 500 mg (200 mg elemental)  2 tablet Oral TID PRN Jenelle Rae MD       • docusate sodium (COLACE) capsule 100 mg  100 mg Oral BID Jenelle Rae MD   100 mg at 04/06/23 2020   • [COMPLETED] famotidine (PEPCID)  injection 20 mg  20 mg Intravenous Once PRN Andrea Junior MD   20 mg at 04/06/23 0055   • HYDROcodone-acetaminophen (NORCO) 5-325 MG per tablet 1 tablet  1 tablet Oral Q4H PRN Jenelle Rae MD        Or   • HYDROcodone-acetaminophen (NORCO)  MG per tablet 1 tablet  1 tablet Oral Q4H PRN KyraJenelle can MD       • Hydrocort-Pramoxine (Perianal) (PROCTOFOAM-HS) 1-1 % rectal foam 1 application  1 application Topical PRN KyraJenelle can MD       • Hydrocortisone (Perianal) (ANUSOL-HC) 2.5 % rectal cream 1 application  1 application Rectal PRN KyraJenelle can MD       • hydrOXYzine (ATARAX) tablet 50 mg  50 mg Oral Nightly PRN KyraJenelle can MD       • ibuprofen (ADVIL,MOTRIN) tablet 600 mg  600 mg Oral Q6H PRN KyraJenelle can MD   600 mg at 04/07/23 0529   • magnesium hydroxide (MILK OF MAGNESIA) suspension 10 mL  10 mL Oral Daily PRN KyraJenelle can MD       • [COMPLETED] methylergonovine (METHERGINE) 0.2 MG/ML injection  - ADS Override Pull        200 mcg at 04/06/23 0135   • [COMPLETED] mineral oil liquid 30 mL  30 mL Topical Once Jenelle Rae MD   30 mL at 04/06/23 0115   • [COMPLETED] miSOPROStol (CYTOTEC) 200 MCG tablet  - ADS Override Pull        800 mcg at 04/06/23 0135   • miSOPROStol (CYTOTEC) tablet 600 mcg  600 mcg Oral Once PRN Jenelle Rae MD       • ondansetron (ZOFRAN) injection 4 mg  4 mg Intravenous Q6H PRN Jenelle Rae MD       • ondansetron (ZOFRAN) tablet 4 mg  4 mg Oral Q8H PRN KyraJenelle can MD       • [COMPLETED] oxytocin (PITOCIN) 30 units in 0.9% sodium chloride 500 mL (premix)  999 mL/hr Intravenous Once Jenelle Rae  mL/hr at 04/06/23 0123 999 mL/hr at 04/06/23 0123   • promethazine (PHENERGAN) tablet 25 mg  25 mg Oral Q6H PRN Jenelle Rae MD        Or   • promethazine (PHENERGAN) suppository 12.5 mg  12.5 mg Rectal Q6H PRN Jenelle Rae MD       • sertraline (ZOLOFT) tablet 50 mg  50 mg Oral Daily Jenelle Rae MD         Lab Results (last 72 hours)      Procedure Component Value Units Date/Time    CBC & Differential [[id number]]  (Abnormal) Collected: 04/07/23 0712    Specimen: Blood Updated: 04/07/23 0731    Narrative:      The following orders were created for panel order CBC & Differential.  Procedure                               Abnormality         Status                     ---------                               -----------         ------                     CBC Auto Differential[[id number]]        Abnormal            Final result                 Please view results for these tests on the individual orders.    CBC Auto Differential [[id number]]  (Abnormal) Collected: 04/07/23 0712    Specimen: Blood Updated: 04/07/23 0731     WBC 6.97 10*3/mm3      RBC 3.00 10*6/mm3      Hemoglobin 10.2 g/dL      Hematocrit 30.3 %      .0 fL      MCH 34.0 pg      MCHC 33.7 g/dL      RDW 11.8 %      RDW-SD 42.6 fl      MPV 10.3 fL      Platelets 157 10*3/mm3      Neutrophil % 45.3 %      Lymphocyte % 44.2 %      Monocyte % 6.7 %      Eosinophil % 2.4 %      Basophil % 1.3 %      Immature Grans % 0.1 %      Neutrophils, Absolute 3.15 10*3/mm3      Lymphocytes, Absolute 3.08 10*3/mm3      Monocytes, Absolute 0.47 10*3/mm3      Eosinophils, Absolute 0.17 10*3/mm3      Basophils, Absolute 0.09 10*3/mm3      Immature Grans, Absolute 0.01 10*3/mm3      nRBC 0.0 /100 WBC     Blood Gas, Arterial, Cord [[id number]]  (Abnormal) Collected: 04/06/23 0135    Specimen: Cord Blood Arterial from Umbilical Cord Updated: 04/06/23 0137     Site N/A     Comment: N/A        pH, Cord Arterial 7.27 pH Units      pCO2, Cord Arterial 59.7 mmHg      pO2, Cord Arterial 22.1 mmHg      HCO3, Cord Arterial 27.2 mmol/L      Base Exc, Cord Arterial -1.0 mmol/L      Barometric Pressure for Blood Gas 751.6 mmHg      Modality Room Air     FIO2 21 %      Ventilator Mode na     O2 Saturation Calculated 29.2 %      Comment: drawn by [id number] @ 0121 Meter: [id number] : [id number] Cindy Bazzi        Note  --    CBC (No Diff) [[id number]]  (Abnormal) Collected: 04/05/23 1602    Specimen: Blood Updated: 04/05/23 1613     WBC 9.63 10*3/mm3      RBC 3.54 10*6/mm3      Hemoglobin 11.9 g/dL      Hematocrit 35.3 %      MCV 99.7 fL      MCH 33.6 pg      MCHC 33.7 g/dL      RDW 12.1 %      RDW-SD 44.3 fl      MPV 9.7 fL      Platelets 196 10*3/mm3     Group B Streptococcus Culture - Swab, Vaginal/Rectum [[id number]] Resulted: 03/15/23    Specimen: Swab from Vaginal/Rectum Updated: 04/05/23 1606     External Strep Group B Ag Negative    Hepatitis C Antibody [[id number]] Resulted: 08/26/22    Specimen: Blood Updated: 04/05/23 1606     External Hepatitis C Ab non-reactive    Hepatitis B Surface Antigen [[id number]] Resulted: 08/26/22    Specimen: Blood Updated: 04/05/23 1606     External Hepatitis B Surface Ag Negative    RPR [[id number]] Resulted: 08/26/22    Specimen: Blood Updated: 04/05/23 1606     External RPR Non-Reactive    Rubella Antibody, IgG [[id number]] Resulted: 08/26/22    Specimen: Blood Updated: 04/05/23 1606     External Rubella Qual Immune    HIV-1 Antibody, EIA [[id number]] Resulted: 08/26/22    Specimen: Blood Updated: 04/05/23 1606     External HIV Antibody Negative          Imaging Results (Last 72 Hours)     ** No results found for the last 72 hours. **        ECG/EMG Results (last 72 hours)     ** No results found for the last 72 hours. **        Orders (last 72 hrs)      Start     Ordered    04/07/23 0800  Sitz Bath  3 Times Daily        Comments: PRN    04/06/23 0410    04/07/23 0000  CBC & Differential  Morning Draw        Comments: Lab to draw      04/06/23 0410    04/07/23 0000  bisacodyl (DULCOLAX) suppository 10 mg  Daily PRN         04/06/23 0410    04/07/23 0000  CBC Auto Differential  PROCEDURE ONCE        Comments: Lab to draw      04/06/23 1601    04/06/23 0900  sertraline (ZOLOFT) tablet 50 mg  Daily         04/06/23 0410    04/06/23 0900  docusate sodium (COLACE) capsule 100 mg  2 Times Daily          04/06/23 0410    04/06/23 0411  Transfer to postpartum when discharge criteria met.  Until Discontinued         04/06/23 0410    04/06/23 0411  Transfer Patient  Once         04/06/23 0410    04/06/23 0411  Code Status and Medical Interventions:  Continuous         04/06/23 0410    04/06/23 0411  Vital Signs Per hospital policy  Per Hospital Policy         04/06/23 0410    04/06/23 0411  Notify Physician  Until Discontinued         04/06/23 0410    04/06/23 0411  Up Ad Yahaira  Until Discontinued         04/06/23 0410    04/06/23 0411  Ambulate Patient  Every Shift       04/06/23 0410    04/06/23 0411  Patient May Shower  Once         04/06/23 0410    04/06/23 0411  Advance Diet As Tolerated -Regular  Until Discontinued         04/06/23 0410    04/06/23 0411  Fundal and Lochia Check  Per Hospital Policy        Comments: Q 15 min x 4, Q 30 min x 2, then Q Shift    04/06/23 0410    04/06/23 0411  RN to Assess Rh Status & Place RhIG Evaluation Order if Indicated  Continuous         04/06/23 0410    04/06/23 0411  Bladder Assessment  Per Order Details        Comments: Postpartum 1) Upon Admission to Unit & Every 4 Hours PRN Until Voiding. 2) Out of Bed to Void in 8 Hours.    04/06/23 0410    04/06/23 0411  Straight Cath  Per Order Details        Comments: Postpartum: If Distended & Unable to Void, May Repeat Once.    04/06/23 0410    04/06/23 0411  Indwelling Urinary Catheter  Per Order Details        Comments: Postpartum : After Straight Cathed x2 or if Greater Than 1000mL Residual, Insert Indwelling Urinary Catheter Until Further MD Order.    04/06/23 0410    04/06/23 0411  Remove Vaginal Packing  Once         04/06/23 0410    04/06/23 0411  Breast pump to bed  Once         04/06/23 0410    04/06/23 0411  If indicated -- Please administer RH Immunoglobulin based on results of cord blood evaluation and fetal screen lab tests, pharmacy to dispense  Per Order Details        Comments: See Process Instructions For Reference  Range Details.    04/06/23 0410    04/06/23 0411  VTE Prophylaxis Not Indicated: No Risk Factors (0); </= 3 (Low Risk)  Once         04/06/23 0410    04/06/23 0410  ibuprofen (ADVIL,MOTRIN) tablet 600 mg  Every 6 Hours PRN         04/06/23 0410    04/06/23 0410  acetaminophen (TYLENOL) tablet 650 mg  Every 6 Hours PRN         04/06/23 0410    04/06/23 0410  HYDROcodone-acetaminophen (NORCO) 5-325 MG per tablet 1 tablet  Every 4 Hours PRN        See Hyperspace for full Linked Orders Report.    04/06/23 0410    04/06/23 0410  HYDROcodone-acetaminophen (NORCO)  MG per tablet 1 tablet  Every 4 Hours PRN        See Hyperspace for full Linked Orders Report.    04/06/23 0410    04/06/23 0410  miSOPROStol (CYTOTEC) tablet 600 mcg  Once As Needed         04/06/23 0410    04/06/23 0410  hydrOXYzine (ATARAX) tablet 50 mg  Nightly PRN         04/06/23 0410    04/06/23 0410  magnesium hydroxide (MILK OF MAGNESIA) suspension 10 mL  Daily PRN         04/06/23 0410    04/06/23 0410  benzocaine-menthol (DERMOPLAST) 20-0.5 % topical spray  As Needed         04/06/23 0410    04/06/23 0410  Hydrocort-Pramoxine (Perianal) (PROCTOFOAM-HS) 1-1 % rectal foam 1 application  As Needed         04/06/23 0410    04/06/23 0410  Hydrocortisone (Perianal) (ANUSOL-HC) 2.5 % rectal cream 1 application  As Needed         04/06/23 0410    04/06/23 0410  benzocaine (AMERICAINE) 20 % rectal ointment 1 application  As Needed         04/06/23 0410    04/06/23 0410  ondansetron (ZOFRAN) tablet 4 mg  Every 8 Hours PRN         04/06/23 0410    04/06/23 0410  ondansetron (ZOFRAN) injection 4 mg  Every 6 Hours PRN         04/06/23 0410    04/06/23 0410  promethazine (PHENERGAN) tablet 25 mg  Every 6 Hours PRN        See Hyperspace for full Linked Orders Report.    04/06/23 0410    04/06/23 0410  promethazine (PHENERGAN) suppository 12.5 mg  Every 6 Hours PRN        See Hyperspace for full Linked Orders Report.    04/06/23 0410    04/06/23 0410  calcium  carbonate (TUMS) chewable tablet 500 mg (200 mg elemental)  3 Times Daily PRN         04/06/23 0410    04/06/23 0223  acetaminophen (TYLENOL) tablet 650 mg  Every 4 Hours PRN,   Status:  Discontinued         04/06/23 0223    04/06/23 0223  ibuprofen (ADVIL,MOTRIN) tablet 600 mg  Every 6 Hours PRN,   Status:  Discontinued         04/06/23 0223    04/06/23 0156  Blood Gas, Arterial -  STAT         04/06/23 0155    04/06/23 0138  Blood Gas, Arterial, Cord  PROCEDURE ONCE         04/06/23 0135 04/06/23 0132  miSOPROStol (CYTOTEC) 200 MCG tablet  - ADS Override Pull        Note to Pharmacy: Created by cabinet override    04/06/23 0132    04/06/23 0132  methylergonovine (METHERGINE) 0.2 MG/ML injection  - ADS Override Pull        Note to Pharmacy: Created by cabinet override    04/06/23 0132    04/06/23 0130  oxytocin (PITOCIN) 30 units in 0.9% sodium chloride 500 mL (premix)  Continuous,   Status:  Discontinued        See Hyperspace for full Linked Orders Report.    04/06/23 0023    04/06/23 0115  oxytocin (PITOCIN) 30 units in 0.9% sodium chloride 500 mL (premix)  Once        See Hyperspace for full Linked Orders Report.    04/06/23 0023    04/05/23 2100  sodium chloride 0.9 % flush 10 mL  Every 12 Hours Scheduled,   Status:  Discontinued         04/05/23 1541    04/05/23 1745  fentaNYL 2mcg/mL and ropivacaine 0.2% in NS epidural 100mL  Continuous,   Status:  Discontinued         04/05/23 1657    04/05/23 1658  Vital Signs Per Anesthesia Guidelines  Continuous,   Status:  Canceled        Comments: Every 2 Minutes x5, Every 5 Minutes x4, then if Stable Every 15 Minutes    04/05/23 1657    04/05/23 1658  Start IV with #16 or #18 gauge angiocath.  Once,   Status:  Canceled         04/05/23 1657    04/05/23 1658  Cardiac Monitoring  Continuous,   Status:  Canceled         04/05/23 1657    04/05/23 1658  Fetal Heart Rate Monitor  Once,   Status:  Canceled         04/05/23 1657    04/05/23 1658  Nurse or anesthesiologist  to remain with patient for 15 minutes following dosing.  Until Discontinued,   Status:  Canceled         04/05/23 1657    04/05/23 1658  Facilitate maternal postion on side and maintain uterine displacement.  Until Discontinued,   Status:  Canceled         04/05/23 1657    04/05/23 1658  Consult anesthesia services prior to changing epidural infusion/rate.  Until Discontinued,   Status:  Canceled         04/05/23 1657    04/05/23 1658  Nurse may remove epidural catheter after delivery.  Until Discontinued,   Status:  Canceled         04/05/23 1657    04/05/23 1658  Insert Indwelling Urinary Catheter  Once,   Status:  Canceled        See Hyperspace for full Linked Orders Report.    04/05/23 1657 04/05/23 1658  Assess Need for Indwelling Urinary Catheter - Follow Removal Protocol  Continuous,   Status:  Canceled        Comments: Indwelling Urinary Catheter Removal Criteria  Discontinue Indwelling Urinary Catheter Unless One of the Following is Present:  Urinary Retention or Obstruction  Chronic Urinary Catheter Use  End of Life  Critical Illness with Strict I/O   Tract or Abdominal Surgery  Stage 3/4 Sacral / Perineal Wound  Required Activity Restriction: Trauma  Required Activity Restriction: Spine Surgery  If Patient is Being Followed by Urology Contact Them PRIOR to Removal  Do Not Remove Indwelling Urinary Catheter Order is Present with a CLINICAL REASON to Maintain the Catheter. Provider is Required to Include a Clinical Reason to Maintain a Urinary Catheter    Patient Admitted With Indwelling Urinary Catheter (Not Placed at [facility])  Assess for Continued Need & Document Medical Necessity  If Infection is Suspected, Contact the Provider       See Hyperspace for full Linked Orders Report.    04/05/23 1657 04/05/23 1658  Urinary Catheter Care  Every Shift,   Status:  Canceled      See Hyperspace for full Linked Orders Report.    04/05/23 1657 04/05/23 1658  Notify physician for the following  conditions:  Until Discontinued,   Status:  Canceled         04/05/23 1657    04/05/23 1657  ePHEDrine injection 5 mg  Every 15 Minutes PRN,   Status:  Discontinued         04/05/23 1657    04/05/23 1657  diphenhydrAMINE (BENADRYL) injection 12.5 mg  Every 8 Hours PRN,   Status:  Discontinued         04/05/23 1657    04/05/23 1657  ondansetron (ZOFRAN) injection 4 mg  Once As Needed,   Status:  Discontinued         04/05/23 1657    04/05/23 1657  famotidine (PEPCID) injection 20 mg  Once As Needed         04/05/23 1657    04/05/23 1630  lactated ringers infusion  Continuous,   Status:  Discontinued         04/05/23 1541    04/05/23 1630  mineral oil liquid 30 mL  Once         04/05/23 1541    04/05/23 1630  oxytocin (PITOCIN) 30 units in 0.9% sodium chloride 500 mL (premix)  Titrated,   Status:  Discontinued         04/05/23 1541    04/05/23 1542  Insert Peripheral IV  Once,   Status:  Canceled         04/05/23 1541    04/05/23 1542  Saline Lock & Maintain IV Access  Continuous,   Status:  Canceled         04/05/23 1541    04/05/23 1541  butorphanol (STADOL) injection 1 mg  Every 3 Hours PRN,   Status:  Discontinued         04/05/23 1541    04/05/23 1541  terbutaline (BRETHINE) injection 0.25 mg  As Needed,   Status:  Discontinued         04/05/23 1541    04/05/23 1541  lidocaine PF 1% (XYLOCAINE) injection 5 mL  As Needed,   Status:  Discontinued         04/05/23 1541    04/05/23 1541  sodium chloride 0.9 % flush 10 mL  As Needed,   Status:  Discontinued         04/05/23 1541    04/05/23 1541  lactated ringers bolus 1,000 mL  Once As Needed,   Status:  Discontinued         04/05/23 1541    04/05/23 1541  Admit To Obstetrics Inpatient  Once         04/05/23 1540    04/05/23 1541  Code Status and Medical Interventions:  Continuous,   Status:  Canceled         04/05/23 1540    04/05/23 1541  Obtain Informed Consent  Once,   Status:  Canceled         04/05/23 1540    04/05/23 1541  VTE Prophylaxis Not Indicated: No  Risk Factors (0); </= 3 (Low Risk)  Once         23 1540    23 1541  Vital Signs Per Hospital Policy  Per Hospital Policy,   Status:  Canceled         23 1540    23 1541  Up Ad Yahaira  Until Discontinued,   Status:  Canceled         23 1540    23 1541  Initiate Group Beta Strep (GBS) Prophylaxis Protocol, If Criteria Met  Continuous,   Status:  Canceled        Comments: NO TREATMENT RECOMMENDED IF: 1) Maternal GBS Status Known Negative 2) Scheduled  Birth With Intact Membranes, Not in Labor 3) Maternal GBS Status Unknown, No Risk Factors  TREAT WITH ANTIBIOTICS IF:  1) Maternal GBS Status Known Positive 2) Maternal GBS Status Unknown With Risk Factors: a)  Previous Infant Affected By GBS Infection b) GBS Urinary Tract Infection (UTI) or Bacteriuria During Pregnancy c) Unexplained Maternal Fever (100.4F (38C) or Greater) During Labor d)  Prolonged Rupture of Membranes (18 or More Hours) e) Gestational Age Less Than 37 Weeks    23 [id number]  Mini-Prep Prior to Delivery  Once,   Status:  Canceled         23 1540    23 1541  Continuous Fetal Monitoring With NST on Admission and Prior to Initiation of Oxytocin.  Per Order Details,   Status:  Canceled        Comments: Continuous Fetal Monitoring With NST on Admission & Prior to Initiation of Oxytocin.    23 [id number] 154  External Uterine Contraction Monitoring  Per Hospital Policy,   Status:  Canceled         23 1540    23 1541  Notify Provider (Specified)  Until Discontinued,   Status:  Canceled         23 1540    23 1541  Notify Provider of Tachysystole (Per Hospital Algorithm)  Until Discontinued,   Status:  Canceled         23 1540    23 1541  Notify Provider if Membranes Ruptured, Bleeding Greater Than 1 Pad Per Hour, Fetal Heart Tone Abnormality or Severe Pain  Until Discontinued,   Status:  Canceled         23 1540    23 1541  Diet:  Liquid Diets; Clear Liquid; Texture: Regular Texture (IDDSI 7); Fluid Consistency: Thin (IDDSI 0)  Diet Effective Now,   Status:  Canceled         04/05/23 1540    04/05/23 1541  Inpatient Anesthesiology Consult  Once,   Status:  Canceled        Specialty:  Anesthesiology  Provider:  (Not yet assigned)    04/05/23 1540    04/05/23 1541  CBC (No Diff)  Once         04/05/23 1540    04/05/23 1541  Type & Screen  Once         04/05/23 1540    04/05/23 1540  Position Change - For Intra-Uterine Resusitation for Hypertonus, HyperStimulation or Non-Reassuring Fetal Status  As Needed,   Status:  Canceled       04/05/23 1540    04/05/23 0000  Group B Streptococcus Culture - Swab, Vaginal/Rectum        Comments: This is an external result entered through the Results Console.      04/05/23 1606    04/05/23 0000  Hepatitis C Antibody        Comments: This is an external result entered through the Results Console.      04/05/23 1606    04/05/23 0000  Hepatitis B Surface Antigen        Comments: This is an external result entered through the Results Console.      04/05/23 1606    04/05/23 0000  RPR        Comments: This is an external result entered through the Results Console.      04/05/23 1606    04/05/23 0000  Rubella Antibody, IgG        Comments: This is an external result entered through the Results Console.      04/05/23 1606    04/05/23 0000  HIV-1 Antibody, EIA        Comments: This is an external result entered through the Results Console.      04/05/23 1606    02/24/23 0000  sertraline (ZOLOFT) 50 MG tablet         04/05/23 1558    Unscheduled  Apply Ice to Perineum  As Needed      Comments: For 20 min q 2 hrs    04/06/23 0410    Unscheduled  Waffle Cushion  As Needed      Comments: For perineal discomfort    04/06/23 0410    Unscheduled  Donut Ring  As Needed      Comments: For perineal pain    04/06/23 0410    Unscheduled  Kpad  As Needed      Comments: For pain    04/06/23 0410    Unscheduled  Apply witch hazel  pads / TUCKS to perineum as needed for comfort PRN  As Needed       04/06/23 0410    Unscheduled  Warm compress  As Needed       04/06/23 0410    Unscheduled  Apply ace wrap, tight bra, or binder  As Needed       04/06/23 0410    Unscheduled  Apply ice packs  As Needed       04/06/23 0410                Operative/Procedure Notes (last 72 hours)  Notes from 04/04/23 0829 through 04/07/23 0829   No notes of this type exist for this encounter.         Physician Progress Notes (last 72 hours)  Notes from 04/04/23 0829 through 04/07/23 0829   No notes of this type exist for this encounter.         Consult Notes (last 72 hours)  Notes from 04/04/23 0829 through 04/07/23 0829   No notes of this type exist for this encounter.

## 2023-04-07 NOTE — LACTATION NOTE
Patient reports baby is BF good. She may choose to supplement baby formula. Pt denies questions. Pediatrician in room at this time. Encouraged pt to call LC if needed. She has [facility] info. Educated on baby's expected output and weight gain.    Lactation Consult Note    Evaluation Completed: 2023 08:14 EDT  Patient Name: Marian Saeed  :  1990  MRN:  [id number]     REFERRAL  INFORMATION:                          Date of Referral: 23   Person Making Referral: lactation consultant  Maternal Reason for Referral: breastfeeding currently       DELIVERY HISTORY:        Skin to skin initiation date/time: 2023  1:22 AM   Skin to skin end date/time: 2023  2:50 AM        MATERNAL ASSESSMENT:                               INFANT ASSESSMENT:  Information for the patient's :  Felicita Saeed [[id number]]   No past medical history on file.                                                                                                     MATERNAL INFANT FEEDING:                                                                       EQUIPMENT TYPE:                                 BREAST PUMPING:          LACTATION REFERRALS:

## 2024-01-11 ENCOUNTER — APPOINTMENT (OUTPATIENT)
Dept: GENERAL RADIOLOGY | Facility: HOSPITAL | Age: 34
End: 2024-01-11
Payer: COMMERCIAL

## 2024-01-11 ENCOUNTER — HOSPITAL ENCOUNTER (EMERGENCY)
Facility: HOSPITAL | Age: 34
Discharge: HOME | End: 2024-01-11
Attending: EMERGENCY MEDICINE
Payer: COMMERCIAL

## 2024-01-11 VITALS
RESPIRATION RATE: 16 BRPM | DIASTOLIC BLOOD PRESSURE: 83 MMHG | SYSTOLIC BLOOD PRESSURE: 123 MMHG | OXYGEN SATURATION: 100 % | HEART RATE: 55 BPM | TEMPERATURE: 97.6 F

## 2024-01-11 DIAGNOSIS — R51.9 MILD HEADACHE: ICD-10-CM

## 2024-01-11 DIAGNOSIS — R07.89 ATYPICAL CHEST PAIN: Primary | ICD-10-CM

## 2024-01-11 LAB
ALBUMIN SERPL-MCNC: 5.2 G/DL (ref 3.5–5.2)
ALBUMIN/GLOB SERPL: 2.2 G/DL
ALP SERPL-CCNC: 57 U/L (ref 39–117)
ALT SERPL W P-5'-P-CCNC: 13 U/L (ref 1–33)
ANION GAP SERPL CALCULATED.3IONS-SCNC: 11.2 MMOL/L (ref 5–15)
AST SERPL-CCNC: 17 U/L (ref 1–32)
BASOPHILS # BLD AUTO: 0.08 10*3/MM3 (ref 0–0.2)
BASOPHILS NFR BLD AUTO: 1.2 % (ref 0–1.5)
BILIRUB SERPL-MCNC: 0.3 MG/DL (ref 0–1.2)
BUN SERPL-MCNC: 15 MG/DL (ref 6–20)
BUN/CREAT SERPL: 20.8 (ref 7–25)
CALCIUM SPEC-SCNC: 10 MG/DL (ref 8.6–10.5)
CHLORIDE SERPL-SCNC: 104 MMOL/L (ref 98–107)
CO2 SERPL-SCNC: 25.8 MMOL/L (ref 22–29)
CREAT SERPL-MCNC: 0.72 MG/DL (ref 0.57–1)
DEPRECATED RDW RBC AUTO: 38.7 FL (ref 37–54)
EGFRCR SERPLBLD CKD-EPI 2021: 114.8 ML/MIN/1.73
EOSINOPHIL # BLD AUTO: 0.26 10*3/MM3 (ref 0–0.4)
EOSINOPHIL NFR BLD AUTO: 3.9 % (ref 0.3–6.2)
ERYTHROCYTE [DISTWIDTH] IN BLOOD BY AUTOMATED COUNT: 11.2 % (ref 12.3–15.4)
GEN 5 2HR TROPONIN T REFLEX: 9 NG/L
GLOBULIN UR ELPH-MCNC: 2.4 GM/DL
GLUCOSE SERPL-MCNC: 104 MG/DL (ref 65–99)
HCT VFR BLD AUTO: 40.3 % (ref 34–46.6)
HGB BLD-MCNC: 13.4 G/DL (ref 12–15.9)
HOLD SPECIMEN: NORMAL
HOLD SPECIMEN: NORMAL
IMM GRANULOCYTES # BLD AUTO: 0.01 10*3/MM3 (ref 0–0.05)
IMM GRANULOCYTES NFR BLD AUTO: 0.2 % (ref 0–0.5)
LYMPHOCYTES # BLD AUTO: 3.04 10*3/MM3 (ref 0.7–3.1)
LYMPHOCYTES NFR BLD AUTO: 45.8 % (ref 19.6–45.3)
MCH RBC QN AUTO: 31.6 PG (ref 26.6–33)
MCHC RBC AUTO-ENTMCNC: 33.3 G/DL (ref 31.5–35.7)
MCV RBC AUTO: 95 FL (ref 79–97)
MONOCYTES # BLD AUTO: 0.39 10*3/MM3 (ref 0.1–0.9)
MONOCYTES NFR BLD AUTO: 5.9 % (ref 5–12)
NEUTROPHILS NFR BLD AUTO: 2.86 10*3/MM3 (ref 1.7–7)
NEUTROPHILS NFR BLD AUTO: 43 % (ref 42.7–76)
NRBC BLD AUTO-RTO: 0 /100 WBC (ref 0–0.2)
PLATELET # BLD AUTO: 266 10*3/MM3 (ref 140–450)
PMV BLD AUTO: 9.4 FL (ref 6–12)
POTASSIUM SERPL-SCNC: 3.5 MMOL/L (ref 3.5–5.2)
PROT SERPL-MCNC: 7.6 G/DL (ref 6–8.5)
RBC # BLD AUTO: 4.24 10*6/MM3 (ref 3.77–5.28)
SODIUM SERPL-SCNC: 141 MMOL/L (ref 136–145)
TROPONIN T DELTA: 1 NG/L
TROPONIN T SERPL HS-MCNC: 8 NG/L
WBC NRBC COR # BLD AUTO: 6.64 10*3/MM3 (ref 3.4–10.8)
WHOLE BLOOD HOLD COAG: NORMAL
WHOLE BLOOD HOLD SPECIMEN: NORMAL

## 2024-01-11 PROCEDURE — 85025 COMPLETE CBC W/AUTO DIFF WBC: CPT

## 2024-01-11 PROCEDURE — 99284 EMERGENCY DEPT VISIT MOD MDM: CPT

## 2024-01-11 PROCEDURE — 80053 COMPREHEN METABOLIC PANEL: CPT

## 2024-01-11 PROCEDURE — 71045 X-RAY EXAM CHEST 1 VIEW: CPT

## 2024-01-11 PROCEDURE — 93010 ELECTROCARDIOGRAM REPORT: CPT | Performed by: INTERNAL MEDICINE

## 2024-01-11 PROCEDURE — 93005 ELECTROCARDIOGRAM TRACING: CPT

## 2024-01-11 PROCEDURE — 84484 ASSAY OF TROPONIN QUANT: CPT

## 2024-01-11 PROCEDURE — 36415 COLL VENOUS BLD VENIPUNCTURE: CPT

## 2024-01-11 RX ORDER — SODIUM CHLORIDE 0.9 % (FLUSH) 0.9 %
10 SYRINGE (ML) INJECTION AS NEEDED
Status: DISCONTINUED | OUTPATIENT
Start: 2024-01-11 | End: 2024-01-12 | Stop reason: HOSPADM

## 2024-01-12 LAB
QT INTERVAL: 426 MS
QTC INTERVAL: 422 MS

## 2024-01-12 NOTE — ED NOTES
Pt dc aaox4 patent airway & steady gait out of er with spouse; pt and spouse verbalized understanding of dc teaching.

## 2024-01-12 NOTE — ED PROVIDER NOTES
EMERGENCY DEPARTMENT ENCOUNTER    Room Number:  18/18  PCP: Mari Leonardo MD  Historian: Patient, spouse      HPI:  Chief Complaint: Chest pain  A complete HPI/ROS/PMH/PSH/SH/FH are unobtainable due to: Nothing  Context: Marian Saeed is a 33 y.o. female who presents to the ED by private vehicle from home c/o intermittent chest tightness for the past several days.  Patient complains of tightness across her upper chest.  Symptoms were initially intermittent but have been more constant since yesterday.  She also planes of nausea and some tightness in her neck.  Denies shortness of breath, sweating, palpitations, dizziness, syncope, leg pain, or leg swelling.  Nothing makes her pain better or worse.  Is not related to exertion.  She also complains of a dull left-sided headache since earlier today.  She took Tylenol with some relief.  She has a history of migraines.  Denies recent head injury, vision changes, or numbness/tingling/weakness in her extremities.  Denies history of hypertension, hyperlipidemia, diabetes, CAD, PE, or DVT.  She does not take birth control pills.  Denies cough, fever, or sore throat.            PAST MEDICAL HISTORY  Active Ambulatory Problems     Diagnosis Date Noted    Migraine with aura and without status migrainosus, not intractable 07/28/2016    Paroxysmal digital cyanosis 07/28/2016    Health care maintenance 07/28/2016    Intermittent palpitations 07/28/2016    Cervicalgia of dgfgfkow-diwvcmq-oddqx region 07/28/2016    Cytopenia 08/16/2016    Pregnancy 04/05/2023     Resolved Ambulatory Problems     Diagnosis Date Noted    Pregnancy 05/13/2021     Past Medical History:   Diagnosis Date    Migraine     PCOS (polycystic ovarian syndrome)     Urticaria, dermatographic          PAST SURGICAL HISTORY  Past Surgical History:   Procedure Laterality Date    KNEE SURGERY      LEFT KNEE  DR. JAVAD HAMPTONDOM TOOTH EXTRACTION           FAMILY HISTORY  Family History   Problem Relation Age of  Onset    Thyroid disease Mother     Migraines Mother     Hypertension Father     Atrial fibrillation Father     Diabetes Paternal Grandmother          SOCIAL HISTORY  Social History     Socioeconomic History    Marital status:    Tobacco Use    Smoking status: Never   Vaping Use    Vaping Use: Never used   Substance and Sexual Activity    Alcohol use: Not Currently    Drug use: Never    Sexual activity: Yes     Partners: Male     Birth control/protection: None         ALLERGIES  Patient has no known allergies.    REVIEW OF SYSTEMS  All systems have been reviewed and are negative except for those listed in the HPI      PHYSICAL EXAM  ED Triage Vitals [01/11/24 1847]   Temp Heart Rate Resp BP SpO2   97.6 °F (36.4 °C) 66 16 136/83 100 %      Temp src Heart Rate Source Patient Position BP Location FiO2 (%)   Tympanic -- -- -- --       Physical Exam      GENERAL: Awake, alert, oriented x 3.  Well-developed, well-nourished female.  Resting comfortably in no acute distress  HENT: NCAT, nares patent, no sinus tenderness, no temporal artery tenderness, oropharynx is benign  EYES: PERRL, EOMI  CV: regular rhythm, normal rate, equal radial pulses bilaterally  RESPIRATORY: normal effort, clear to auscultation bilaterally  ABDOMEN: soft, nondistended, nontender  MUSCULOSKELETAL: Extremities are nontender with full range of motion.  No calf tenderness.  No pedal edema.  Chest wall is nontender  NEURO: Speech is normal.  No facial droop.  Normal strength in all extremities  PSYCH:  calm, cooperative  SKIN: warm, dry    Vital signs and nursing notes reviewed.          LAB RESULTS  Recent Results (from the past 24 hour(s))   Comprehensive Metabolic Panel    Collection Time: 01/11/24  6:51 PM    Specimen: Arm, Right; Blood   Result Value Ref Range    Glucose 104 (H) 65 - 99 mg/dL    BUN 15 6 - 20 mg/dL    Creatinine 0.72 0.57 - 1.00 mg/dL    Sodium 141 136 - 145 mmol/L    Potassium 3.5 3.5 - 5.2 mmol/L    Chloride 104 98 - 107  mmol/L    CO2 25.8 22.0 - 29.0 mmol/L    Calcium 10.0 8.6 - 10.5 mg/dL    Total Protein 7.6 6.0 - 8.5 g/dL    Albumin 5.2 3.5 - 5.2 g/dL    ALT (SGPT) 13 1 - 33 U/L    AST (SGOT) 17 1 - 32 U/L    Alkaline Phosphatase 57 39 - 117 U/L    Total Bilirubin 0.3 0.0 - 1.2 mg/dL    Globulin 2.4 gm/dL    A/G Ratio 2.2 g/dL    BUN/Creatinine Ratio 20.8 7.0 - 25.0    Anion Gap 11.2 5.0 - 15.0 mmol/L    eGFR 114.8 >60.0 mL/min/1.73   High Sensitivity Troponin T    Collection Time: 01/11/24  6:51 PM    Specimen: Arm, Right; Blood   Result Value Ref Range    HS Troponin T 8 <14 ng/L   Green Top (Gel)    Collection Time: 01/11/24  6:51 PM   Result Value Ref Range    Extra Tube Hold for add-ons.    Lavender Top    Collection Time: 01/11/24  6:51 PM   Result Value Ref Range    Extra Tube hold for add-on    Gold Top - SST    Collection Time: 01/11/24  6:51 PM   Result Value Ref Range    Extra Tube Hold for add-ons.    Light Blue Top    Collection Time: 01/11/24  6:51 PM   Result Value Ref Range    Extra Tube Hold for add-ons.    CBC Auto Differential    Collection Time: 01/11/24  6:51 PM    Specimen: Arm, Right; Blood   Result Value Ref Range    WBC 6.64 3.40 - 10.80 10*3/mm3    RBC 4.24 3.77 - 5.28 10*6/mm3    Hemoglobin 13.4 12.0 - 15.9 g/dL    Hematocrit 40.3 34.0 - 46.6 %    MCV 95.0 79.0 - 97.0 fL    MCH 31.6 26.6 - 33.0 pg    MCHC 33.3 31.5 - 35.7 g/dL    RDW 11.2 (L) 12.3 - 15.4 %    RDW-SD 38.7 37.0 - 54.0 fl    MPV 9.4 6.0 - 12.0 fL    Platelets 266 140 - 450 10*3/mm3    Neutrophil % 43.0 42.7 - 76.0 %    Lymphocyte % 45.8 (H) 19.6 - 45.3 %    Monocyte % 5.9 5.0 - 12.0 %    Eosinophil % 3.9 0.3 - 6.2 %    Basophil % 1.2 0.0 - 1.5 %    Immature Grans % 0.2 0.0 - 0.5 %    Neutrophils, Absolute 2.86 1.70 - 7.00 10*3/mm3    Lymphocytes, Absolute 3.04 0.70 - 3.10 10*3/mm3    Monocytes, Absolute 0.39 0.10 - 0.90 10*3/mm3    Eosinophils, Absolute 0.26 0.00 - 0.40 10*3/mm3    Basophils, Absolute 0.08 0.00 - 0.20 10*3/mm3    Immature  Grans, Absolute 0.01 0.00 - 0.05 10*3/mm3    nRBC 0.0 0.0 - 0.2 /100 WBC   ECG 12 Lead ED Triage Standing Order; Chest Pain    Collection Time: 01/11/24  6:52 PM   Result Value Ref Range    QT Interval 426 ms    QTC Interval 422 ms   High Sensitivity Troponin T 2Hr    Collection Time: 01/11/24  8:53 PM    Specimen: Blood   Result Value Ref Range    HS Troponin T 9 <14 ng/L    Troponin T Delta 1 >=-4 - <+4 ng/L       Ordered the above labs and reviewed the results.        RADIOLOGY  XR Chest 1 View    Result Date: 1/11/2024  XR CHEST 1 VW-  HISTORY: Female who is 33 years-old, chest pain  TECHNIQUE: Frontal view of the chest  COMPARISON: None available  FINDINGS: Heart, mediastinum and pulmonary vasculature are unremarkable. No focal pulmonary consolidation, pleural effusion, or pneumothorax. No acute osseous process.      No evidence for acute pulmonary process. Follow-up as clinical indications persist.  This report was finalized on 1/11/2024 7:58 PM by Dr. Andrea Renee M.D on Workstation: [employer]       Ordered the above noted radiological studies. Reviewed by me in PACS.            PROCEDURES  Procedures              MEDICATIONS GIVEN IN ER  Medications - No data to display                  MEDICAL DECISION MAKING, PROGRESS, and CONSULTS    All labs have been independently reviewed by me.  All radiology studies have been reviewed by me and I have also reviewed the radiology report.   EKG's independently viewed and interpreted by me.  Discussion below represents my analysis of pertinent findings related to patient's condition, differential diagnosis, treatment plan and final disposition.      Additional sources:  - Discussed/ obtained information from independent historians: Spouse at bedside    - External (non-ED) record review: Patient was last admitted here in April 2023 for vaginal delivery.  She does not have any prior ED visits here.    - Chronic or social conditions impacting care: None          Orders  placed during this visit:  Orders Placed This Encounter   Procedures    XR Chest 1 View    [place] Draw    Comprehensive Metabolic Panel    High Sensitivity Troponin T    CBC Auto Differential    High Sensitivity Troponin T 2Hr    Undress & Gown    Continuous Pulse Oximetry    ECG 12 Lead ED Triage Standing Order; Chest Pain    CBC & Differential    Green Top (Gel)    Lavender Top    Gold Top - SST    Light Blue Top         Additional orders considered but not ordered:  D-dimer/CTA chest: Patient is PERC negative        Differential diagnosis:    DDx includes but is not limited to acute coronary syndrome, pulmonary embolism, thoracic aortic dissection, pneumonia, pneumothorax, musculoskeletal pain, GERD or esophageal spasm, anxiety, myocarditis/pericarditis, esophageal rupture, pancreatitis.                Independent interpretation of labs, radiology studies, and discussions with consultants:  ED Course as of 01/12/24 0138   Thu Jan 11, 2024 2021 HS Troponin T: 8 [WH]   2021 WBC: 6.64 [WH]   2021 Hemoglobin: 13.4 [WH]   2021 Glucose(!): 104 [WH]   2021 Creatinine: 0.72 [WH]   2021 Chest x-ray personally interpreted by me.  My personal interpretation is: Heart size is normal.  Lungs are clear.  No pleural effusion. [WH]   2021 EKG personally interpreted by me.  My personal interpretation is:          EKG time: 1852  Rhythm/Rate: Sinus bradycardia, rate 59  P waves and WV: Normal  QRS, axis: Normal  ST and T waves: Normal    Interpreted Contemporaneously by me, independently viewed  No prior available for comparison    [WH]   2125 HS Troponin T: 9 [WH]   2125 Troponin T Delta: 1 [WH]   2208 Patient is resting comfortably.  Test results were discussed with her and her .  Workup was unremarkable.  Patient we discharged.  Return precautions were discussed [WH]   2227 MDM: Patient presented to ED complaining of intermittent chest discomfort.  Her symptoms were atypical.  EKG was normal.  Troponin was negative x  2.  Chest x-ray not show any evidence of pneumonia, pleural effusion, or pneumothorax.  Patient was PERC negative. [WH]      ED Course User Index  [WH] Michael Sebastian MD               DIAGNOSIS  Final diagnoses:   Atypical chest pain   Mild headache         DISPOSITION  DISCHARGE    Patient discharged in stable condition.    Reviewed implications of results, diagnosis, meds, responsibility to follow up, warning signs and symptoms of possible worsening, potential complications and reasons to return to ER, including worsening/persistent pain, shortness of breath, dizziness, fainting, or other concern.    Patient/Family voiced understanding of above instructions.    Discussed plan for discharge, as there is no emergent indication for admission. Patient referred to primary care provider for BP management due to today's BP. Pt/family is agreeable and understands need for follow up and repeat testing.  Pt is aware that discharge does not mean that nothing is wrong but it indicates no emergency is present that requires admission and they must continue care with follow-up as given below or physician of their choice.     FOLLOW-UP  Mari Leonardo MD  [address] [place] [address]  [phone]    Schedule an appointment as soon as possible for a visit   If symptoms persist         Medication List      No changes were made to your prescriptions during this visit.                   Latest Documented Vital Signs:  As of 01:38 EST  BP- 123/83 HR- 55 Temp- 97.6 °F (36.4 °C) (Tympanic) O2 sat- 100%              --    Please note that portions of this were completed with a voice recognition program.       Note Disclaimer: At [facility], we believe that sharing information builds trust and better relationships. You are receiving this note because you are receiving care at [facility] or recently visited. It is possible you will see health information before a provider has talked with you about it. This kind of  information can be easy to misunderstand. To help you fully understand what it means for your health, we urge you to discuss this note with your provider.             Michael Sebastian MD  01/12/24 0138

## 2024-01-12 NOTE — DISCHARGE INSTRUCTIONS
Follow-up with your primary care provider if symptoms do not get better in the next few days.  Return to the emergency department for worsening symptoms, trouble breathing, fever, dizziness, fainting, or other concern.

## 2024-08-26 ENCOUNTER — OFFICE VISIT (OUTPATIENT)
Dept: INTERNAL MEDICINE | Facility: CLINIC | Age: 34
End: 2024-08-26
Payer: COMMERCIAL

## 2024-08-26 VITALS
SYSTOLIC BLOOD PRESSURE: 114 MMHG | DIASTOLIC BLOOD PRESSURE: 74 MMHG | BODY MASS INDEX: 20.59 KG/M2 | OXYGEN SATURATION: 100 % | TEMPERATURE: 97.1 F | HEIGHT: 67 IN | WEIGHT: 131.2 LBS | HEART RATE: 65 BPM

## 2024-08-26 DIAGNOSIS — J01.00 ACUTE NON-RECURRENT MAXILLARY SINUSITIS: ICD-10-CM

## 2024-08-26 DIAGNOSIS — Z76.89 ENCOUNTER TO ESTABLISH CARE: ICD-10-CM

## 2024-08-26 DIAGNOSIS — Z00.00 ANNUAL PHYSICAL EXAM: Primary | ICD-10-CM

## 2024-08-26 DIAGNOSIS — Z00.00 ROUTINE HEALTH MAINTENANCE: ICD-10-CM

## 2024-08-26 PROBLEM — J03.00 ACUTE NON-RECURRENT STREPTOCOCCAL TONSILLITIS: Status: ACTIVE | Noted: 2024-08-26

## 2024-08-26 PROBLEM — K29.70 VIRAL GASTRITIS: Status: ACTIVE | Noted: 2024-08-26

## 2024-08-26 LAB
ALBUMIN SERPL-MCNC: 4.5 G/DL (ref 3.5–5.2)
ALBUMIN/GLOB SERPL: 1.9 G/DL
ALP SERPL-CCNC: 53 U/L (ref 39–117)
ALT SERPL-CCNC: 9 U/L (ref 1–33)
AST SERPL-CCNC: 14 U/L (ref 1–32)
BASOPHILS # BLD AUTO: 0.06 10*3/MM3 (ref 0–0.2)
BASOPHILS NFR BLD AUTO: 1.1 % (ref 0–1.5)
BILIRUB SERPL-MCNC: 0.4 MG/DL (ref 0–1.2)
BUN SERPL-MCNC: 10 MG/DL (ref 6–20)
BUN/CREAT SERPL: 14.7 (ref 7–25)
CALCIUM SERPL-MCNC: 9.7 MG/DL (ref 8.6–10.5)
CHLORIDE SERPL-SCNC: 101 MMOL/L (ref 98–107)
CHOLEST SERPL-MCNC: 170 MG/DL (ref 0–200)
CO2 SERPL-SCNC: 25 MMOL/L (ref 22–29)
CREAT SERPL-MCNC: 0.68 MG/DL (ref 0.57–1)
EGFRCR SERPLBLD CKD-EPI 2021: 119.6 ML/MIN/1.73
EOSINOPHIL # BLD AUTO: 0.17 10*3/MM3 (ref 0–0.4)
EOSINOPHIL NFR BLD AUTO: 3 % (ref 0.3–6.2)
ERYTHROCYTE [DISTWIDTH] IN BLOOD BY AUTOMATED COUNT: 11.1 % (ref 12.3–15.4)
GLOBULIN SER CALC-MCNC: 2.4 GM/DL
GLUCOSE SERPL-MCNC: 99 MG/DL (ref 65–99)
HCT VFR BLD AUTO: 39.9 % (ref 34–46.6)
HDLC SERPL-MCNC: 74 MG/DL (ref 40–60)
HGB BLD-MCNC: 13.1 G/DL (ref 12–15.9)
IMM GRANULOCYTES # BLD AUTO: 0.01 10*3/MM3 (ref 0–0.05)
IMM GRANULOCYTES NFR BLD AUTO: 0.2 % (ref 0–0.5)
LDLC SERPL CALC-MCNC: 83 MG/DL (ref 0–100)
LYMPHOCYTES # BLD AUTO: 2.62 10*3/MM3 (ref 0.7–3.1)
LYMPHOCYTES NFR BLD AUTO: 46 % (ref 19.6–45.3)
MCH RBC QN AUTO: 32.1 PG (ref 26.6–33)
MCHC RBC AUTO-ENTMCNC: 32.8 G/DL (ref 31.5–35.7)
MCV RBC AUTO: 97.8 FL (ref 79–97)
MONOCYTES # BLD AUTO: 0.39 10*3/MM3 (ref 0.1–0.9)
MONOCYTES NFR BLD AUTO: 6.9 % (ref 5–12)
NEUTROPHILS # BLD AUTO: 2.44 10*3/MM3 (ref 1.7–7)
NEUTROPHILS NFR BLD AUTO: 42.8 % (ref 42.7–76)
NRBC BLD AUTO-RTO: 0 /100 WBC (ref 0–0.2)
PLATELET # BLD AUTO: 273 10*3/MM3 (ref 140–450)
POTASSIUM SERPL-SCNC: 4.4 MMOL/L (ref 3.5–5.2)
PROT SERPL-MCNC: 6.9 G/DL (ref 6–8.5)
RBC # BLD AUTO: 4.08 10*6/MM3 (ref 3.77–5.28)
SODIUM SERPL-SCNC: 138 MMOL/L (ref 136–145)
TRIGL SERPL-MCNC: 68 MG/DL (ref 0–150)
TSH SERPL DL<=0.005 MIU/L-ACNC: 2.92 UIU/ML (ref 0.27–4.2)
VLDLC SERPL CALC-MCNC: 13 MG/DL (ref 5–40)
WBC # BLD AUTO: 5.69 10*3/MM3 (ref 3.4–10.8)

## 2024-08-26 PROCEDURE — 99385 PREV VISIT NEW AGE 18-39: CPT

## 2024-08-26 RX ORDER — AZITHROMYCIN 250 MG/1
TABLET, FILM COATED ORAL
Qty: 6 TABLET | Refills: 0 | Status: SHIPPED | OUTPATIENT
Start: 2024-08-26 | End: 2024-09-13

## 2024-08-26 RX ORDER — SUMATRIPTAN 25 MG/1
TABLET, FILM COATED ORAL
COMMUNITY

## 2024-08-26 RX ORDER — AZITHROMYCIN 250 MG/1
TABLET, FILM COATED ORAL
Qty: 6 TABLET | Refills: 0 | Status: SHIPPED | OUTPATIENT
Start: 2024-08-26 | End: 2024-08-26

## 2024-08-27 ENCOUNTER — TELEPHONE (OUTPATIENT)
Dept: INTERNAL MEDICINE | Facility: CLINIC | Age: 34
End: 2024-08-27
Payer: COMMERCIAL

## 2024-08-27 NOTE — TELEPHONE ENCOUNTER
----- Message from Hina Valdovinos sent at 8/27/2024 11:40 AM EDT -----  Ms. Saeed,   I have reviewed your lab work. Your CBC shows slight anemia, you can take over the counter IRON supplements, they may cause constipation so please take a stool softener if needed. Your CMP shows normal liver and kidney function. Your thyroid function is within normal range. Your cholesterol levels show an elevation in your HDL which is good, the higher the better. We will continue to monitor your lab work with routine visits. Please let me know if you have any further questions or concerns.     Thank you for allowing us to care for you,  GILL Perez

## 2024-08-27 NOTE — TELEPHONE ENCOUNTER
Called pt [phone] and spoke to and relayed results, pt made aware to purchase OTC iron supplements and that we would follow up 8/2025. Pt has no questions or concerns

## 2024-09-13 ENCOUNTER — OFFICE VISIT (OUTPATIENT)
Dept: INTERNAL MEDICINE | Facility: CLINIC | Age: 34
End: 2024-09-13
Payer: COMMERCIAL

## 2024-09-13 ENCOUNTER — HOSPITAL ENCOUNTER (OUTPATIENT)
Facility: HOSPITAL | Age: 34
Discharge: HOME | End: 2024-09-13
Payer: COMMERCIAL

## 2024-09-13 VITALS
SYSTOLIC BLOOD PRESSURE: 100 MMHG | HEIGHT: 67 IN | HEART RATE: 61 BPM | RESPIRATION RATE: 20 BRPM | BODY MASS INDEX: 20.81 KG/M2 | OXYGEN SATURATION: 99 % | DIASTOLIC BLOOD PRESSURE: 70 MMHG | TEMPERATURE: 98.1 F | WEIGHT: 132.6 LBS

## 2024-09-13 DIAGNOSIS — R09.89 CHEST CONGESTION: ICD-10-CM

## 2024-09-13 DIAGNOSIS — N76.0 ACUTE VAGINITIS: Primary | ICD-10-CM

## 2024-09-13 PROCEDURE — 99213 OFFICE O/P EST LOW 20 MIN: CPT

## 2024-09-13 PROCEDURE — 71046 X-RAY EXAM CHEST 2 VIEWS: CPT

## 2024-09-13 RX ORDER — FLUCONAZOLE 150 MG/1
150 TABLET ORAL ONCE
Qty: 1 TABLET | Refills: 0 | Status: SHIPPED | OUTPATIENT
Start: 2024-09-13 | End: 2024-09-14

## 2024-09-13 NOTE — PROGRESS NOTES
"Chief Complaint  URI (Just rattling in the chest) and Vaginitis    Subjective        Marian Saeed presents to [facility]  History of Present Illness  33-year-old female presenting with upper respiratory symptoms and vaginitis.  Patient of MARSHA Perez Was seen on 8/26 and prescribed azithromycin for respiratory infection.  Completed Z-Rush as prescribed.  Tolerated well and then started having rattling in her chest.  Rattling occurs occasionally.  Notices last night when reading in bed.  Has occasional dry cough but denies wheezing, sinus drainage, sinus pressure, fever, difficulty breathing or shortness of breath.  After completing Z-Rush, developed vaginitis.  URI     Vaginitis        Objective   Vital Signs:  /70 (BP Location: Right arm)   Pulse 61   Temp 98.1 °F (36.7 °C) (Oral)   Resp 20   Ht 170.2 cm (67\")   Wt 60.1 kg (132 lb 9.6 oz)   SpO2 99%   BMI 20.77 kg/m²   Estimated body mass index is 20.77 kg/m² as calculated from the following:    Height as of this encounter: 170.2 cm (67\").    Weight as of this encounter: 60.1 kg (132 lb 9.6 oz).       BMI is within normal parameters. No other follow-up for BMI required.      Physical Exam  Vitals reviewed.   Constitutional:       Appearance: Normal appearance.   HENT:      Head: Normocephalic.   Cardiovascular:      Rate and Rhythm: Normal rate and regular rhythm.      Pulses: Normal pulses.      Heart sounds: Normal heart sounds.   Pulmonary:      Effort: Pulmonary effort is normal.      Breath sounds: Normal breath sounds.   Musculoskeletal:         General: Normal range of motion.      Cervical back: Normal range of motion.   Skin:     General: Skin is warm and dry.      Capillary Refill: Capillary refill takes less than 2 seconds.   Neurological:      General: No focal deficit present.      Mental Status: She is alert and oriented to person, place, and time.   Psychiatric:         Mood and Affect: Mood " normal.         Behavior: Behavior normal.         Thought Content: Thought content normal.         Judgment: Judgment normal.        Result Review :      Common labs          1/11/2024    18:51 8/26/2024    13:10   Common Labs   Glucose 104  99    BUN 15  10    Creatinine 0.72  0.68    Sodium 141  138    Potassium 3.5  4.4    Chloride 104  101    Calcium 10.0  9.7    Total Protein  6.9    Albumin 5.2  4.5    Total Bilirubin 0.3  0.4    Alkaline Phosphatase 57  53    AST (SGOT) 17  14    ALT (SGPT) 13  9    WBC 6.64  5.69    Hemoglobin 13.4  13.1    Hematocrit 40.3  39.9    Platelets 266  273    Total Cholesterol  170    Triglycerides  68    HDL Cholesterol  74    LDL Cholesterol   83          Current Outpatient Medications on File Prior to Visit   Medication Sig Dispense Refill    ibuprofen (ADVIL,MOTRIN) 600 MG tablet Take 1 tablet by mouth Every 8 (Eight) Hours As Needed for Mild Pain (First Line: Mild pain.). 40 tablet 0    SUMAtriptan (IMITREX) 25 MG tablet       azithromycin (Zithromax Z-Rush) 250 MG tablet Take 2 tablets the first day, then 1 tablet daily for 4 days. (Patient not taking: Reported on 9/13/2024) 6 tablet 0     No current facility-administered medications on file prior to visit.                Assessment and Plan     Diagnoses and all orders for this visit:    1. Acute vaginitis (Primary)  -     fluconazole (Diflucan) 150 MG tablet; Take 1 tablet by mouth 1 (One) Time for 1 dose.  Dispense: 1 tablet; Refill: 0    2. Chest congestion  -     XR Chest PA & Lateral        Patient Instructions   Take Diflucan as prescribed. Chest x-ray today. Take deeps breaths throughout the day. Stay hydrated with water. Follow-up as needed.            Follow Up     Return if symptoms worsen or fail to improve.  Patient was given instructions and counseling regarding her condition or for health maintenance advice. Please see specific information pulled into the AVS if appropriate.

## 2024-09-13 NOTE — PATIENT INSTRUCTIONS
Take Diflucan as prescribed. Chest x-ray today. Take deeps breaths throughout the day. Stay hydrated with water. Follow-up as needed.

## 2024-12-16 ENCOUNTER — OFFICE VISIT (OUTPATIENT)
Dept: INTERNAL MEDICINE | Facility: CLINIC | Age: 34
End: 2024-12-16
Payer: COMMERCIAL

## 2024-12-16 VITALS
BODY MASS INDEX: 20.09 KG/M2 | DIASTOLIC BLOOD PRESSURE: 82 MMHG | HEIGHT: 67 IN | OXYGEN SATURATION: 100 % | HEART RATE: 93 BPM | SYSTOLIC BLOOD PRESSURE: 118 MMHG | TEMPERATURE: 97.1 F | WEIGHT: 128 LBS

## 2024-12-16 DIAGNOSIS — R09.89 CHEST CONGESTION: Primary | ICD-10-CM

## 2024-12-16 DIAGNOSIS — J06.9 VIRAL UPPER RESPIRATORY TRACT INFECTION: ICD-10-CM

## 2024-12-16 PROCEDURE — 99213 OFFICE O/P EST LOW 20 MIN: CPT

## 2024-12-16 NOTE — PATIENT INSTRUCTIONS
Upper Respiratory Infection, Adult  An upper respiratory infection (URI) affects the nose, throat, and upper airways that lead to the lungs. The most common type of URI is often called the common cold. URIs usually get better on their own, without medical treatment.  What are the causes?  A URI is caused by a germ (virus). You may catch these germs by:  Breathing in droplets from an infected person's cough or sneeze.  Touching something that has the germ on it (is contaminated) and then touching your mouth, nose, or eyes.  What increases the risk?  You are more likely to get a URI if:  You are very young or very old.  You have close contact with others, such as at work, school, or a health care facility.  You smoke.  You have long-term (chronic) heart or lung disease.  You have a weakened disease-fighting system (immune system).  You have nasal allergies or asthma.  You have a lot of stress.  You have poor nutrition.  What are the signs or symptoms?  Runny or stuffy (congested) nose.  Cough.  Sneezing.  Sore throat.  Headache.  Feeling tired (fatigue).  Fever.  Not wanting to eat as much as usual.  Pain in your forehead, behind your eyes, and over your cheekbones (sinus pain).  Muscle aches.  Redness or irritation of the eyes.  Pressure in the ears or face.  How is this treated?  URIs usually get better on their own within 7-10 days. Medicines cannot cure URIs, but your doctor may recommend certain medicines to help relieve symptoms, such as:  Over-the-counter cold medicines.  Medicines to reduce coughing (cough suppressants). Coughing is a type of defense against infection that helps to clear the nose, throat, windpipe, and lungs (respiratory system). Take these medicines only as told by your doctor.  Medicines to lower your fever.  Follow these instructions at home:  Activity  Rest as needed.  If you have a fever, stay home from work or school until your fever is gone, or until your doctor says you may return to  work or school.  You should stay home until you cannot spread the infection anymore (you are not contagious).  Your doctor may have you wear a face mask so you have less risk of spreading the infection.  Relieving symptoms  Rinse your mouth often with salt water. To make salt water, dissolve ½-1 tsp (3-6 g) of salt in 1 cup (237 mL) of warm water.  Use a cool-mist humidifier to add moisture to the air. This can help you breathe more easily.  Eating and drinking    Drink enough fluid to keep your pee (urine) pale yellow.  Eat soups and other clear broths.  General instructions    Take over-the-counter and prescription medicines only as told by your doctor.  Do not smoke or use any products that contain nicotine or tobacco. If you need help quitting, ask your doctor.  Avoid being where people are smoking (avoid secondhand smoke).  Stay up to date on all your shots (immunizations), and get the flu shot every year.  Keep all follow-up visits.  How to prevent the spread of infection to others    Wash your hands with soap and water for at least 20 seconds. If you cannot use soap and water, use hand .  Avoid touching your mouth, face, eyes, or nose.  Cough or sneeze into a tissue or your sleeve or elbow. Do not cough or sneeze into your hand or into the air.  Contact a doctor if:  You are getting worse, not better.  You have any of these:  A fever or chills.  Brown or red mucus in your nose.  Yellow or brown fluid (discharge)coming from your nose.  Pain in your face, especially when you bend forward.  Swollen neck glands.  Pain when you swallow.  White areas in the back of your throat.  Get help right away if:  You have shortness of breath that gets worse.  You have very bad or constant:  Headache.  Ear pain.  Pain in your forehead, behind your eyes, and over your cheekbones (sinus pain).  Chest pain.  You have long-lasting (chronic) lung disease along with any of these:  Making high-pitched whistling sounds when  you breathe, most often when you breathe out (wheezing).  Long-lasting cough (more than 14 days).  Coughing up blood.  A change in your usual mucus.  You have a stiff neck.  You have changes in your:  Vision.  Hearing.  Thinking.  Mood.  These symptoms may be an emergency. Get help right away. Call 911.  Do not wait to see if the symptoms will go away.  Do not drive yourself to the hospital.  Summary  An upper respiratory infection (URI) is caused by a germ (virus). The most common type of URI is often called the common cold.  URIs usually get better within 7-10 days.  Take over-the-counter and prescription medicines only as told by your doctor.  This information is not intended to replace advice given to you by your health care provider. Make sure you discuss any questions you have with your health care provider.  Document Revised: 07/20/2022 Document Reviewed: 07/20/2022  [employer] Patient Education © 2024 [employer]

## 2024-12-16 NOTE — PROGRESS NOTES
Chief Complaint  URI (Chest rattling X1 week in evenings, following symptoms from 9/2024. )     Subjective:      History of Present Illness {CC  Problem List  Visit  Diagnosis   Encounters  Notes  Medications  Labs  Result Review Imaging  Media :23}     Marian Saeed presents to [facility] for:    Patient or patient representative verbalized consent for the use of Ambient Listening during the visit with  GILL Perez for chart documentation. 12/16/2024  08:59 EST     History of Present Illness          The patient presents for evaluation of sinus congestion.    She reports experiencing intermittent sinus congestion, predominantly in the evening hours, with occasional daytime occurrences. She has not experienced any associated shortness of breath, wheezing or coughing up clear mucus. She has not used any over-the-counter medications such as Mucinex. Additionally, she does not report any sinus pressure. She has not been using antihistamines such as Claritin or Allegra. She expresses a preference for holistic treatment approaches over pharmacological interventions.    Over the past 1 to 2 weeks, she has been experiencing palpitations upon awakening at night, which resolve spontaneously and are not present during the day.         I have reviewed patient's medical history, any new submitted information provided by patient or medical assistant and updated medical record.      Objective:      Physical Exam  Vitals reviewed.   Constitutional:       General: She is awake. She is not in acute distress.     Appearance: Normal appearance. She is not ill-appearing, toxic-appearing or diaphoretic.   HENT:      Head: Normocephalic.      Right Ear: Hearing normal.      Left Ear: Hearing normal.      Nose: Nose normal. Congestion present.      Right Sinus: No maxillary sinus tenderness or frontal sinus tenderness.      Left Sinus: No maxillary sinus tenderness or frontal  sinus tenderness.      Mouth/Throat:      Lips: Pink.      Mouth: Mucous membranes are moist.      Pharynx: Oropharynx is clear. Uvula midline.      Tonsils: No tonsillar exudate.   Eyes:      General: Lids are normal. Vision grossly intact.      Conjunctiva/sclera: Conjunctivae normal.   Cardiovascular:      Rate and Rhythm: Normal rate and regular rhythm.      Pulses: Normal pulses.      Heart sounds: Normal heart sounds, S1 normal and S2 normal.   Pulmonary:      Effort: Pulmonary effort is normal.      Breath sounds: Normal breath sounds. No stridor, decreased air movement or transmitted upper airway sounds. No decreased breath sounds, wheezing, rhonchi or rales.   Abdominal:      General: Abdomen is flat. Bowel sounds are normal.      Palpations: Abdomen is soft.   Lymphadenopathy:      Head:      Right side of head: No submental, submandibular, tonsillar or preauricular adenopathy.      Left side of head: No submental, submandibular, tonsillar or preauricular adenopathy.      Cervical: No cervical adenopathy.      Right cervical: No superficial, deep or posterior cervical adenopathy.     Left cervical: No superficial, deep or posterior cervical adenopathy.   Skin:     General: Skin is warm and dry.      Capillary Refill: Capillary refill takes less than 2 seconds.   Neurological:      General: No focal deficit present.      Mental Status: She is oriented to person, place, and time. Mental status is at baseline. She is lethargic.   Psychiatric:         Attention and Perception: Attention and perception normal.         Mood and Affect: Mood and affect normal.         Speech: Speech normal.         Behavior: Behavior normal. Behavior is cooperative.         Cognition and Memory: Cognition and memory normal.         Judgment: Judgment normal.        Result Review  Data Reviewed:{ Labs  Result Review  Imaging  Med Tab  Media :23}     Results                    Vital Signs:   /82 (BP Location: Left arm,  "Patient Position: Sitting, Cuff Size: Adult)   Pulse 93   Temp 97.1 °F (36.2 °C) (Temporal)   Ht 170.2 cm (67.01\")   Wt 58.1 kg (128 lb)   SpO2 100%   BMI 20.04 kg/m²     BMI is within normal parameters. No other follow-up for BMI required.             Requested Prescriptions      No prescriptions requested or ordered in this encounter       Routine medications provided by this office will also be refilled via pharmacy request.       Current Outpatient Medications:     ibuprofen (ADVIL,MOTRIN) 600 MG tablet, Take 1 tablet by mouth Every 8 (Eight) Hours As Needed for Mild Pain (First Line: Mild pain.)., Disp: 40 tablet, Rfl: 0    SUMAtriptan (IMITREX) 25 MG tablet, , Disp: , Rfl:     erythromycin (ROMYCIN) 5 MG/GM ophthalmic ointment, Apply a 1cm ribbon into affected eye(s) 4 times every day. Apply ribbon into the lower conjunctival sac(s), Disp: 10 g, Rfl: 1     Assessment and Plan:      Assessment and Plan {CC Problem List  Visit Diagnosis  ROS  Review (Popup)  [facility] Maintenance  Quality  BestPractice  Medications  SmartSets  SnapShot Encounters  Media :23}     Problem List Items Addressed This Visit    None           1. Sinus congestion.  Her symptoms suggest a mild viral infection, leading to sinus congestion. There is no indication for antibiotic therapy at this time. She is advised to take over-the-counter Mucinex (not DM) for 3 to 5 days to alleviate congestion. She is also recommended to use Claritin or Allegra for the next few weeks. Honey can be used as a natural remedy at night. If she develops green sputum or a severe cough, she should schedule a telehealth consultation for further evaluation and potential prescription of a Z-Rush.    2. Palpitations.  She reports experiencing heart palpitations at night over the past week or two. Given her history of palpitations and irregular heartbeat, it is important to avoid medications containing pseudoephedrine. She is advised to monitor her " symptoms and avoid Mucinex DM. If palpitations persist or worsen, further evaluation may be necessary.     Upper Respiratory Infection     An upper respiratory infection is also called a common cold. It can affect your nose, throat, ears, and sinuses.   Common signs and symptoms include the following: Cold symptoms are usually worst for the first 3 to 5 days. You may have any of the following:  · Runny or stuffy nose  · Sneezing and coughing  · Sore throat or hoarseness  · Red, watery, and sore eyes  · Fatigue   · Chills and fever  · Headache, body aches, or sore muscles    Seek care immediately if:   · You have chest pain or trouble breathing.    Contact your healthcare provider if:   · You have a fever over 102ºF (39°C).  · Your sore throat gets worse or you see white or yellow spots in your throat.  · Your symptoms get worse after 3 to 5 days or your cold is not better in 14 days.  · You have a rash anywhere on your skin.  · You have large, tender lumps in your neck.  · You have thick, green or yellow drainage from your nose.  · You cough up thick yellow, green, or bloody mucus.  · You have vomiting for more than 24 hours and cannot keep fluids down.  · You have a bad earache.  · You have questions or concerns about your condition or care.    Treatment for a cold: There is no cure for the common cold. Colds are caused by viruses and do not get better with antibiotics. Most people get better in 7 to 14 days. You may continue to cough for 2 to 3 weeks. The following may help decrease your symptoms:  · Decongestants help reduce nasal congestion and help you breathe more easily. If you take decongestant pills, they may make you feel restless or not able to sleep. Do not use decongestant sprays for more than a few days.    · Cough suppressants help reduce coughing. Ask your healthcare provider which type of cough medicine is best for you.     · NSAIDs , such as ibuprofen, help decrease swelling, pain, and fever.  NSAIDs can cause stomach bleeding or kidney problems in certain people. If you take blood thinner medicine, always ask your healthcare provider if NSAIDs are safe for you. Always read the medicine label and follow directions.    · Acetaminophen decreases pain and fever. It is available without a doctor's order. Ask how much to take and how often to take it. Follow directions. Read the labels of all other medicines you are using to see if they also contain acetaminophen, or ask your doctor or pharmacist. Acetaminophen can cause liver damage if not taken correctly. Do not use more than 4 grams (4,000 milligrams) total of acetaminophen in one day.    Manage your cold:   · Rest as much as possible. Slowly start to do more each day.     · Drink more liquids as directed. Liquids will help thin and loosen mucus so you can cough it up. Liquids will also help prevent dehydration. Liquids that help prevent dehydration include water, fruit juice, and broth. Do not drink liquids that contain caffeine. Caffeine can increase your risk for dehydration. Ask your healthcare provider how much liquid to drink each day.     · Soothe a sore throat. Gargle with warm salt water. This helps your sore throat feel better. Make salt water by dissolving ¼ teaspoon salt in 1 cup warm water. You may also suck on hard candy or throat lozenges. You may use a sore throat spray.    · Use a humidifier or vaporizer. Use a cool mist humidifier or a vaporizer to increase air moisture in your home. This may make it easier for you to breathe and help decrease your cough.     · Use saline nasal drops as directed. These help relieve congestion.     · Apply petroleum-based jelly around the outside of your nostrils. This can decrease irritation from blowing your nose.     · Do not smoke. Nicotine and other chemicals in cigarettes and cigars can make your symptoms worse. They can also cause infections such as bronchitis or pneumonia. Ask your healthcare  provider for information if you currently smoke and need help to quit. E-cigarettes or smokeless tobacco still contain nicotine. Talk to your healthcare provider before you use these products.    Prevent spreading your cold to others:   · Try to stay away from other people during the first 2 to 3 days of your cold when it is more easily spread.   · Do not share food or drinks.   · Do not share hand towels with household members.  · Wash your hands often, especially after you blow your nose. Turn away from other people and cover your mouth and nose with a tissue when you sneeze or cough.    Please review added information under the Patient Instructions portion of your print out.    Thank you for allowing us to care for you,  GILL Perez        Follow Up {Instructions Charge Capture  Follow-up Communications :23}     No follow-ups on file.      Patient was given instructions and counseling regarding her condition or for health maintenance advice. Please see specific information pulled into the AVS if appropriate.        Dragon disclaimer:   Much of this encounter note is an electronic transcription/translation of spoken language to printed text. The electronic translation of spoken language may permit erroneous, or at times, nonsensical words or phrases to be inadvertently transcribed; Although I have reviewed the note for such errors, some may still exist.     Additional Patient Counseling:       There are no Patient Instructions on file for this visit.

## 2024-12-31 ENCOUNTER — TELEPHONE (OUTPATIENT)
Dept: INTERNAL MEDICINE | Facility: CLINIC | Age: 34
End: 2024-12-31
Payer: COMMERCIAL

## 2024-12-31 NOTE — TELEPHONE ENCOUNTER
Called pt [phone] to see if she wanted to sched a same day at 1p and pt stated she was pulling into urgent care at time of call

## 2025-01-09 ENCOUNTER — OFFICE VISIT (OUTPATIENT)
Dept: INTERNAL MEDICINE | Facility: CLINIC | Age: 35
End: 2025-01-09
Payer: COMMERCIAL

## 2025-01-09 VITALS
DIASTOLIC BLOOD PRESSURE: 78 MMHG | HEIGHT: 67 IN | SYSTOLIC BLOOD PRESSURE: 114 MMHG | BODY MASS INDEX: 19.78 KG/M2 | HEART RATE: 72 BPM | TEMPERATURE: 97.3 F | OXYGEN SATURATION: 100 % | WEIGHT: 126 LBS

## 2025-01-09 DIAGNOSIS — H66.001 NON-RECURRENT ACUTE SUPPURATIVE OTITIS MEDIA OF RIGHT EAR WITHOUT SPONTANEOUS RUPTURE OF TYMPANIC MEMBRANE: Primary | ICD-10-CM

## 2025-01-09 DIAGNOSIS — J01.00 ACUTE NON-RECURRENT MAXILLARY SINUSITIS: ICD-10-CM

## 2025-01-09 PROCEDURE — 99213 OFFICE O/P EST LOW 20 MIN: CPT

## 2025-01-09 RX ORDER — AZITHROMYCIN 250 MG/1
TABLET, FILM COATED ORAL
Qty: 6 TABLET | Refills: 0 | Status: SHIPPED | OUTPATIENT
Start: 2025-01-09 | End: 2025-01-17

## 2025-01-09 RX ORDER — FLUTICASONE PROPIONATE 50 MCG
2 SPRAY, SUSPENSION (ML) NASAL DAILY
Qty: 32 G | Refills: 0 | Status: SHIPPED | OUTPATIENT
Start: 2025-01-09

## 2025-01-09 NOTE — PROGRESS NOTES
Chief Complaint  Earache (Rt ear pain x9 days, finished steriod from 12/31 UC visit, echoing, stiffness/pain behind ear/neck  )     Subjective:      History of Present Illness {CC  Problem List  Visit  Diagnosis   Encounters  Notes  Medications  Labs  Result Review Imaging  Media :23}     Marian Saeed presents to [facility] for:    Patient or patient representative verbalized consent for the use of Ambient Listening during the visit with  GILL Perez for chart documentation. 1/23/2025  13:16 EST     History of Present Illness          The patient presents for evaluation of right ear pain.    She experienced severe right ear pain last Tuesday, prompting a consultation with Dr. Carter, who diagnosed her with a blocked eustachian tube. She has no history of ear-related issues. She reports no itching or scratching in her ears. She has not been using any allergy medications. She has been experiencing drainage from her ears, which has shown improvement. She also reports feeling slightly off balance and has had a few episodes of dizziness. She has not used Flonase before but has used saline spray. A course of Medrol Dosepak was prescribed, which she completed on Monday. The medication alleviated the extreme pain, but she continues to experience discomfort.    She has been experiencing migraines, which are typically associated with her menstrual cycle.    Supplemental Information  Her daughter had RSV. She was sick during Thanksgiving and Tray. She took Z-Rush in 08/2024 for chest congestion, which is now clear and tolerated that well.     ALLERGIES  The patient has no known allergies.    MEDICATIONS  Current: Medrol Dosepak  Past: Z-Rush         I have reviewed patient's medical history, any new submitted information provided by patient or medical assistant and updated medical record.      Objective:      Physical Exam  Vitals reviewed.   Constitutional:        General: She is awake. She is not in acute distress.     Appearance: Normal appearance. She is not ill-appearing, toxic-appearing or diaphoretic.   HENT:      Head: Normocephalic.      Right Ear: Hearing normal. Drainage present. A middle ear effusion is present. Tympanic membrane is bulging. Tympanic membrane has decreased mobility.      Left Ear: Hearing normal. Drainage present. Tympanic membrane is not bulging. Tympanic membrane has normal mobility.      Nose: Nose normal. Congestion and rhinorrhea present. Rhinorrhea is purulent.      Right Sinus: Maxillary sinus tenderness present. No frontal sinus tenderness.      Left Sinus: Maxillary sinus tenderness present. No frontal sinus tenderness.      Mouth/Throat:      Lips: Pink.      Mouth: Mucous membranes are moist.      Pharynx: Oropharynx is clear. Uvula midline.      Tonsils: No tonsillar exudate or tonsillar abscesses.   Eyes:      General: Lids are normal. Vision grossly intact. Allergic shiner present.      Conjunctiva/sclera: Conjunctivae normal.   Neck:      Thyroid: No thyroid mass, thyromegaly or thyroid tenderness.      Trachea: Trachea and phonation normal.   Cardiovascular:      Rate and Rhythm: Normal rate and regular rhythm.      Pulses: Normal pulses.      Heart sounds: Normal heart sounds.   Pulmonary:      Effort: Pulmonary effort is normal.      Breath sounds: Normal breath sounds.   Abdominal:      General: Bowel sounds are normal.   Lymphadenopathy:      Head:      Right side of head: No submental, submandibular or tonsillar adenopathy.      Left side of head: No submental, submandibular or tonsillar adenopathy.      Cervical: No cervical adenopathy.   Skin:     General: Skin is warm and dry.      Capillary Refill: Capillary refill takes less than 2 seconds.   Neurological:      General: No focal deficit present.      Mental Status: She is alert and oriented to person, place, and time. Mental status is at baseline.   Psychiatric:          "Attention and Perception: Attention and perception normal.         Mood and Affect: Mood and affect normal.         Speech: Speech normal.         Behavior: Behavior normal. Behavior is cooperative.         Cognition and Memory: Cognition and memory normal.         Judgment: Judgment normal.        Result Review  Data Reviewed:{ Labs  Result Review  Imaging  Med Tab  Media :23}     Results         The following data was reviewed by: GILL Perez on 01/09/2025         UC with Alvarez Carter MD (12/31/2024)   Vital Signs:   /78 (BP Location: Right arm, Patient Position: Sitting, Cuff Size: Adult)   Pulse 72   Temp 97.3 °F (36.3 °C) (Temporal)   Ht 170.2 cm (67.01\")   Wt 57.2 kg (126 lb)   SpO2 100%   BMI 19.73 kg/m²     BMI is within normal parameters. No other follow-up for BMI required.             Requested Prescriptions     Signed Prescriptions Disp Refills    fluticasone (FLONASE) 50 MCG/ACT nasal spray 32 g 0     Sig: Administer 2 sprays into the nostril(s) as directed by provider Daily.       Routine medications provided by this office will also be refilled via pharmacy request.       Current Outpatient Medications:     SUMAtriptan (IMITREX) 25 MG tablet, , Disp: , Rfl:     cefdinir (OMNICEF) 300 MG capsule, Take 1 capsule by mouth 2 (Two) Times a Day., Disp: 14 capsule, Rfl: 0    fluticasone (FLONASE) 50 MCG/ACT nasal spray, Administer 2 sprays into the nostril(s) as directed by provider Daily., Disp: 32 g, Rfl: 0     Assessment and Plan:      Assessment and Plan {CC Problem List  Visit Diagnosis  ROS  Review (Popup)  [facility] Maintenance  Quality  BestPractice  Medications  SmartSets  SnapShot Encounters  Media :23}     Problem List Items Addressed This Visit    None  Visit Diagnoses       Non-recurrent acute suppurative otitis media of right ear without spontaneous rupture of tympanic membrane    -  Primary    Acute non-recurrent maxillary sinusitis                   "   1. Right otitis media.  The patient reports significant pain in the right ear, which was previously treated with a Medrol Dosepak. Examination reveals an amount of fluid and irritation in the right ear.  She is advised to use Flonase nasal spray for the next 2 weeks to help dry out the ears. Proper administration technique for nasal sprays was demonstrated.    2. Sinusitis.  The patient has been experiencing drainage and off-balance sensations. The symptoms suggest a sinus infection. A prescription for Z-Rush will be provided to manage the sinusitis. She is advised to continue using Flonase nasal spray for the next 2 weeks.       Please review added information under the Patient Instructions portion of your print out.    Please take antibiotic in full and finish medrol dose pack    Thank you for allowing us to care for you,  GILL Perez    Follow Up {Instructions Charge Capture  Follow-up Communications :23}     Return if symptoms worsen or fail to improve.      Patient was given instructions and counseling regarding her condition or for health maintenance advice. Please see specific information pulled into the AVS if appropriate.    Dragon disclaimer:   Much of this encounter note is an electronic transcription/translation of spoken language to printed text. The electronic translation of spoken language may permit erroneous, or at times, nonsensical words or phrases to be inadvertently transcribed; Although I have reviewed the note for such errors, some may still exist.     Additional Patient Counseling:       Patient Instructions     Clinical References    Upper Respiratory Infection, Adult  An upper respiratory infection (URI) affects the nose, throat, and upper airways that lead to the lungs. The most common type of URI is often called the common cold. URIs usually get better on their own, without medical treatment.  What are the causes?  A URI is caused by a germ (virus). You may catch these germs  by:  Breathing in droplets from an infected person's cough or sneeze.  Touching something that has the germ on it (is contaminated) and then touching your mouth, nose, or eyes.  What increases the risk?  You are more likely to get a URI if:  You are very young or very old.  You have close contact with others, such as at work, school, or a health care facility.  You smoke.  You have long-term (chronic) heart or lung disease.  You have a weakened disease-fighting system (immune system).  You have nasal allergies or asthma.  You have a lot of stress.  You have poor nutrition.  What are the signs or symptoms?  Runny or stuffy (congested) nose.  Cough.  Sneezing.  Sore throat.  Headache.  Feeling tired (fatigue).  Fever.  Not wanting to eat as much as usual.  Pain in your forehead, behind your eyes, and over your cheekbones (sinus pain).  Muscle aches.  Redness or irritation of the eyes.  Pressure in the ears or face.  How is this treated?  URIs usually get better on their own within 7-10 days. Medicines cannot cure URIs, but your doctor may recommend certain medicines to help relieve symptoms, such as:  Over-the-counter cold medicines.  Medicines to reduce coughing (cough suppressants). Coughing is a type of defense against infection that helps to clear the nose, throat, windpipe, and lungs (respiratory system). Take these medicines only as told by your doctor.  Medicines to lower your fever.  Follow these instructions at home:  Activity  Rest as needed.  If you have a fever, stay home from work or school until your fever is gone, or until your doctor says you may return to work or school.  You should stay home until you cannot spread the infection anymore (you are not contagious).  Your doctor may have you wear a face mask so you have less risk of spreading the infection.  Relieving symptoms  Rinse your mouth often with salt water. To make salt water, dissolve ½-1 tsp (3-6 g) of salt in 1 cup (237 mL) of warm  water.  Use a cool-mist humidifier to add moisture to the air. This can help you breathe more easily.  Eating and drinking    Drink enough fluid to keep your pee (urine) pale yellow.  Eat soups and other clear broths.  General instructions    Take over-the-counter and prescription medicines only as told by your doctor.  Do not smoke or use any products that contain nicotine or tobacco. If you need help quitting, ask your doctor.  Avoid being where people are smoking (avoid secondhand smoke).  Stay up to date on all your shots (immunizations), and get the flu shot every year.  Keep all follow-up visits.  How to prevent the spread of infection to others    Wash your hands with soap and water for at least 20 seconds. If you cannot use soap and water, use hand .  Avoid touching your mouth, face, eyes, or nose.  Cough or sneeze into a tissue or your sleeve or elbow. Do not cough or sneeze into your hand or into the air.  Contact a doctor if:  You are getting worse, not better.  You have any of these:  A fever or chills.  Brown or red mucus in your nose.  Yellow or brown fluid (discharge)coming from your nose.  Pain in your face, especially when you bend forward.  Swollen neck glands.  Pain when you swallow.  White areas in the back of your throat.  Get help right away if:  You have shortness of breath that gets worse.  You have very bad or constant:  Headache.  Ear pain.  Pain in your forehead, behind your eyes, and over your cheekbones (sinus pain).  Chest pain.  You have long-lasting (chronic) lung disease along with any of these:  Making high-pitched whistling sounds when you breathe, most often when you breathe out (wheezing).  Long-lasting cough (more than 14 days).  Coughing up blood.  A change in your usual mucus.  You have a stiff neck.  You have changes in your:  Vision.  Hearing.  Thinking.  Mood.  These symptoms may be an emergency. Get help right away. Call 911.  Do not wait to see if the symptoms  will go away.  Do not drive yourself to the hospital.  Summary  An upper respiratory infection (URI) is caused by a germ (virus). The most common type of URI is often called the common cold.  URIs usually get better within 7-10 days.  Take over-the-counter and prescription medicines only as told by your doctor.  This information is not intended to replace advice given to you by your health care provider. Make sure you discuss any questions you have with your health care provider.  Document Revised: 07/20/2022 Document Reviewed: 07/20/2022  [employer] Patient Education © 2024 [employer]  Sinus Infection, Adult  A sinus infection is soreness and swelling (inflammation) of your sinuses. Sinuses are hollow spaces in the bones around your face. They are located:  Around your eyes.  In the middle of your forehead.  Behind your nose.  In your cheekbones.  Your sinuses and nasal passages are lined with a fluid called mucus. Mucus drains out of your sinuses. Swelling can trap mucus in your sinuses. This lets germs (bacteria, virus, or fungus) grow, which leads to infection. Most of the time, this condition is caused by a virus.  What are the causes?  Allergies.  Asthma.  Germs.  Things that block your nose or sinuses.  Growths in the nose (nasal polyps).  Chemicals or irritants in the air.  A fungus. This is rare.  What increases the risk?  Having a weak body defense system (immune system).  Doing a lot of swimming or diving.  Using nasal sprays too much.  Smoking.  What are the signs or symptoms?  The main symptoms of this condition are pain and a feeling of pressure around the sinuses. Other symptoms include:  Stuffy nose (congestion). This may make it hard to breathe through your nose.  Runny nose (drainage).  Soreness, swelling, and warmth in the sinuses.  A cough that may get worse at night.  Being unable to smell and taste.  Mucus that collects in the throat or the back of the nose (postnasal drip). This may cause a  sore throat or bad breath.  Being very tired (fatigued).  A fever.  How is this diagnosed?  Your symptoms.  Your medical history.  A physical exam.  Tests to find out if your condition is short-term (acute) or long-term (chronic). Your doctor may:  Check your nose for growths (polyps).  Check your sinuses using a tool that has a light on one end (endoscope).  Check for allergies or germs.  Do imaging tests, such as an MRI or CT scan.  How is this treated?  Treatment for this condition depends on the cause and whether it is short-term or long-term.  If caused by a virus, your symptoms should go away on their own within 10 days. You may be given medicines to relieve symptoms. They include:  Medicines that shrink swollen tissue in the nose.  A spray that treats swelling of the nostrils.  Rinses that help get rid of thick mucus in your nose (nasal saline washes).  Medicines that treat allergies (antihistamines).  Over-the-counter pain relievers.  If caused by bacteria, your doctor may wait to see if you will get better without treatment. You may be given antibiotic medicine if you have:  A very bad infection.  A weak body defense system.  If caused by growths in the nose, surgery may be needed.  Follow these instructions at home:  Medicines  Take, use, or apply over-the-counter and prescription medicines only as told by your doctor. These may include nasal sprays.  If you were prescribed an antibiotic medicine, take it as told by your doctor. Do not stop taking it even if you start to feel better.  Hydrate and humidify    Drink enough water to keep your pee (urine) pale yellow.  Use a cool mist humidifier to keep the humidity level in your home above 50%.  Breathe in steam for 10-15 minutes, 3-4 times a day, or as told by your doctor. You can do this in the bathroom while a hot shower is running.  Try not to spend time in cool or dry air.  Rest  Rest as much as you can.  Sleep with your head raised (elevated).  Make  sure you get enough sleep each night.  General instructions    Put a warm, moist washcloth on your face 3-4 times a day, or as often as told by your doctor.  Use nasal saline washes as often as told by your doctor.  Wash your hands often with soap and water. If you cannot use soap and water, use hand .  Do not smoke. Avoid being around people who are smoking (secondhand smoke).  Keep all follow-up visits.  Contact a doctor if:  You have a fever.  Your symptoms get worse.  Your symptoms do not get better within 10 days.  Get help right away if:  You have a very bad headache.  You cannot stop vomiting.  You have very bad pain or swelling around your face or eyes.  You have trouble seeing.  You feel confused.  Your neck is stiff.  You have trouble breathing.  These symptoms may be an emergency. Get help right away. Call 911.  Do not wait to see if the symptoms will go away.  Do not drive yourself to the hospital.  Summary  A sinus infection is swelling of your sinuses. Sinuses are hollow spaces in the bones around your face.  This condition is caused by tissues in your nose that become inflamed or swollen. This traps germs. These can lead to infection.  If you were prescribed an antibiotic medicine, take it as told by your doctor. Do not stop taking it even if you start to feel better.  Keep all follow-up visits.  This information is not intended to replace advice given to you by your health care provider. Make sure you discuss any questions you have with your health care provider.  Document Revised: 11/22/2022 Document Reviewed: 11/22/2022  [employer] Patient Education © 2024 [employer]

## 2025-01-17 ENCOUNTER — OFFICE VISIT (OUTPATIENT)
Dept: INTERNAL MEDICINE | Facility: CLINIC | Age: 35
End: 2025-01-17
Payer: COMMERCIAL

## 2025-01-17 VITALS
SYSTOLIC BLOOD PRESSURE: 98 MMHG | WEIGHT: 128 LBS | OXYGEN SATURATION: 98 % | TEMPERATURE: 97.1 F | DIASTOLIC BLOOD PRESSURE: 68 MMHG | BODY MASS INDEX: 20.09 KG/M2 | HEART RATE: 62 BPM | HEIGHT: 67 IN

## 2025-01-17 DIAGNOSIS — H66.006 RECURRENT ACUTE SUPPURATIVE OTITIS MEDIA WITHOUT SPONTANEOUS RUPTURE OF TYMPANIC MEMBRANE OF BOTH SIDES: Primary | ICD-10-CM

## 2025-01-17 DIAGNOSIS — B37.9 YEAST INFECTION: ICD-10-CM

## 2025-01-17 PROCEDURE — 99213 OFFICE O/P EST LOW 20 MIN: CPT

## 2025-01-17 RX ORDER — FLUCONAZOLE 150 MG/1
150 TABLET ORAL ONCE
Qty: 2 TABLET | Refills: 0 | Status: SHIPPED | OUTPATIENT
Start: 2025-01-17 | End: 2025-01-19

## 2025-01-17 RX ORDER — CEFDINIR 300 MG/1
300 CAPSULE ORAL 2 TIMES DAILY
Qty: 14 CAPSULE | Refills: 0 | Status: SHIPPED | OUTPATIENT
Start: 2025-01-17

## 2025-01-17 NOTE — PROGRESS NOTES
Chief Complaint  Earache (Continued rt ear pain following steroids and z pack - congestion has since resolved )     Subjective:      History of Present Illness {CC  Problem List  Visit  Diagnosis   Encounters  Notes  Medications  Labs  Result Review Imaging  Media :23}     Marian Saeed presents to [facility] for:    Patient or patient representative verbalized consent for the use of Ambient Listening during the visit with  GILL Perez for chart documentation. 1/29/2025  15:50 EST     History of Present Illness      The patient presents for evaluation of bilateral ear pain.    She reports persistent bilateral ear discomfort, describing it as a soreness that has not shown significant improvement. She has no prior history of otological issues and has not previously consulted with an ENT specialist. She has been utilizing Flonase as part of her treatment regimen.    She is seeking medication for a yeast infection, expressing concern that the condition may worsen with the current treatment.    Additionally, she inquires about the compatibility of sumatriptan with her other medications, noting that she did not require its use today.    MEDICATIONS  Current: Flonase, sumatriptan     Past Medical History:   Diagnosis Date    Migraine     PCOS (polycystic ovarian syndrome)     Urticaria, dermatographic      Family History   Problem Relation Age of Onset    Thyroid disease Mother     Migraines Mother     Hypertension Father     Atrial fibrillation Father     Diabetes Paternal Grandmother      Social History     Tobacco Use   Smoking Status Never   Smokeless Tobacco Never     Social History     Substance and Sexual Activity   Alcohol Use Not Currently    Alcohol/week: 1.0 standard drink of alcohol         I have reviewed patient's medical history, any new submitted information provided by patient or medical assistant and updated medical record.      Objective:       Physical Exam  Vitals reviewed.   Constitutional:       General: She is awake. She is not in acute distress.     Appearance: Normal appearance. She is well-groomed. She is not ill-appearing, toxic-appearing or diaphoretic.   HENT:      Head: Normocephalic.      Right Ear: Hearing normal. Drainage present. A middle ear effusion is present. No mastoid tenderness. Tympanic membrane is bulging. Tympanic membrane is not perforated. Tympanic membrane has decreased mobility.      Left Ear: Hearing normal. A middle ear effusion is present. No mastoid tenderness. Tympanic membrane is bulging. Tympanic membrane is not perforated. Tympanic membrane has decreased mobility.      Nose: Nose normal. Congestion present.      Mouth/Throat:      Lips: Pink.      Mouth: Mucous membranes are moist.      Pharynx: Oropharynx is clear. Uvula midline.   Eyes:      General: Lids are normal. Vision grossly intact.      Conjunctiva/sclera: Conjunctivae normal.   Cardiovascular:      Rate and Rhythm: Normal rate and regular rhythm.      Pulses: Normal pulses.      Heart sounds: Normal heart sounds.   Pulmonary:      Effort: Pulmonary effort is normal.      Breath sounds: Normal breath sounds.   Abdominal:      General: Bowel sounds are normal.   Skin:     General: Skin is warm and dry.      Capillary Refill: Capillary refill takes less than 2 seconds.   Neurological:      General: No focal deficit present.      Mental Status: She is alert and oriented to person, place, and time. Mental status is at baseline.   Psychiatric:         Attention and Perception: Attention and perception normal.         Mood and Affect: Mood and affect normal.         Speech: Speech normal.         Behavior: Behavior normal. Behavior is cooperative.         Cognition and Memory: Cognition and memory normal.         Judgment: Judgment normal.        Result Review  Data Reviewed:{ Labs  Result Review  Imaging  Med Tab  Media :23}     Results                 "    Vital Signs:   BP 98/68 (BP Location: Right arm, Patient Position: Sitting, Cuff Size: Adult)   Pulse 62   Temp 97.1 °F (36.2 °C) (Temporal)   Ht 170.2 cm (67.01\")   Wt 58.1 kg (128 lb)   SpO2 98%   BMI 20.04 kg/m²     BMI is within normal parameters. No other follow-up for BMI required.             Requested Prescriptions     Signed Prescriptions Disp Refills    cefdinir (OMNICEF) 300 MG capsule 14 capsule 0     Sig: Take 1 capsule by mouth 2 (Two) Times a Day.    fluconazole (Diflucan) 150 MG tablet 2 tablet 0     Sig: Take 1 tablet by mouth 1 (One) Time for 1 dose. Take one tablet today and may repeat in three days if needed       Routine medications provided by this office will also be refilled via pharmacy request.       Current Outpatient Medications:     fluticasone (FLONASE) 50 MCG/ACT nasal spray, Administer 2 sprays into the nostril(s) as directed by provider Daily., Disp: 32 g, Rfl: 0    SUMAtriptan (IMITREX) 25 MG tablet, , Disp: , Rfl:     cefdinir (OMNICEF) 300 MG capsule, Take 1 capsule by mouth 2 (Two) Times a Day., Disp: 14 capsule, Rfl: 0     Assessment and Plan:      Assessment and Plan {CC Problem List  Visit Diagnosis  ROS  Review (Popup)  Health Maintenance  Quality  BestPractice  Medications  SmartSets  SnapShot Encounters  Media :23}     Problem List Items Addressed This Visit    None  Visit Diagnoses       Recurrent acute suppurative otitis media without spontaneous rupture of tympanic membrane of both sides    -  Primary    Yeast infection                     1. Bilateral otitis media.  A prescription for Omnicef (cefdinir) has been issued to manage the bilateral ear pain. She is advised to continue using Flonase. If symptoms persist, a referral to an ENT specialist will be considered.    2. Yeast infection.  A prescription for an antifungal medication has been provided. She is instructed to take one dose immediately upon receipt and another dose post completion of the " antibiotic course. The dosage interval should be a minimum of 3 days.    3. Medication management.  She is cleared to continue taking sumatriptan along with the other prescribed medications.     Please review added information under the Patient Instructions portion of your print out.    Thank you for allowing us to care for you,  GILL Perez    Follow Up {Instructions Charge Capture  Follow-up Communications :23}     No follow-ups on file.      Patient was given instructions and counseling regarding her condition or for health maintenance advice. Please see specific information pulled into the AVS if appropriate.        Dragon disclaimer:   Much of this encounter note is an electronic transcription/translation of spoken language to printed text. The electronic translation of spoken language may permit erroneous, or at times, nonsensical words or phrases to be inadvertently transcribed; Although I have reviewed the note for such errors, some may still exist.     Additional Patient Counseling:       Patient Instructions     Clinical References    Otitis Media, Adult    Otitis media is a condition in which the middle ear is red and swollen (inflamed) and full of fluid. The middle ear is the part of the ear that contains bones for hearing as well as air that helps send sounds to the brain. The condition usually goes away on its own.  What are the causes?  This condition is caused by a blockage in the eustachian tube. This tube connects the middle ear to the back of the nose. It normally allows air into the middle ear. The blockage is caused by fluid or swelling. Problems that can cause blockage include:  A cold or infection that affects the nose, mouth, or throat.  Allergies.  An irritant, such as tobacco smoke.  Adenoids that have become large. The adenoids are soft tissue located in the back of the throat, behind the nose and the roof of the mouth.  Growth or swelling in the upper part of the throat, just  behind the nose (nasopharynx).  Damage to the ear caused by a change in pressure. This is called barotrauma.  What increases the risk?  You are more likely to develop this condition if you:  Smoke or are exposed to tobacco smoke.  Have an opening in the roof of your mouth (cleft palate).  Have acid reflux.  Have problems in your body's defense system (immune system).  What are the signs or symptoms?  Symptoms of this condition include:  Ear pain.  Fever.  Problems with hearing.  Being tired.  Fluid leaking from the ear.  Ringing in the ear.  How is this treated?  This condition can go away on its own within 3-5 days. But if the condition is caused by germs (bacteria) and does not go away on its own, or if it keeps coming back, your doctor may:  Give you antibiotic medicines.  Give you medicines for pain.  Follow these instructions at home:  Take over-the-counter and prescription medicines only as told by your doctor.  If you were prescribed an antibiotic medicine, take it as told by your doctor. Do not stop taking it even if you start to feel better.  Keep all follow-up visits.  Contact a doctor if:  You have bleeding from your nose.  There is a lump on your neck.  You are not feeling better in 5 days.  You feel worse instead of better.  Get help right away if:  You have pain that is not helped with medicine.  You have swelling, redness, or pain around your ear.  You get a stiff neck.  You cannot move part of your face (paralysis).  You notice that the bone behind your ear hurts when you touch it.  You get a very bad headache.  Summary  Otitis media means that the middle ear is red, swollen, and full of fluid.  This condition usually goes away on its own.  If the problem does not go away, treatment may be needed. You may be given medicines to treat the infection or to treat your pain.  If you were prescribed an antibiotic medicine, take it as told by your doctor. Do not stop taking it even if you start to feel  better.  Keep all follow-up visits.  This information is not intended to replace advice given to you by your health care provider. Make sure you discuss any questions you have with your health care provider.  Document Revised: 03/28/2022 Document Reviewed: 03/28/2022  Elsevier Patient Education © 2024 [employer]  Ear Drainage  Ear drainage means that earwax, pus, blood, or other fluids come out of the ear.  Follow these instructions at home:  Watch for changes in your ear drainage. Let your doctor know about them. Take these actions to help relieve your symptoms.  Protect your ear    Do not use cotton-tipped swabs in your ear. Do not put any other objects into your ear.  Do not swim until your doctor says it is okay.  Before you shower, cover a cotton ball with petroleum jelly and put that into your ear. This helps to keep water out of your ear.  Wash your hands with soap and water for 20 seconds before and after you touch your ears.  General instructions  Take over-the-counter and prescription medicines only as told by your doctor. Finish all antibiotic medicine even when you start to feel better.  Avoid being around tobacco smoke.  Keep all follow-up visits.  Contact a doctor if:  You have more drainage.  You have ear pain.  You have a fever.  Your drainage is not getting better with treatment.  Your ear drainage is bloody, white, clear, or yellow.  Your ear is red or swollen.  Get help right away if:  You have very bad ear pain.  You have a very bad headache.  You vomit.  You feel dizzy.  You have a seizure.  You have new hearing loss.  These symptoms may be an emergency. Get help right away. Call your local emergency services (911 in the [place]  Do not wait to see if the symptoms will go away.  Do not drive yourself to the hospital.  Summary  Ear drainage means that earwax, pus, blood, or other fluids come out of the ear.  Watch for changes in your symptoms. Tell your doctor about them. Follow what your  doctor tells you to do.  Talk to your doctor if you have more drainage, bloody drainage, ear pain, a fever, or swelling.  Get help right away if you are vomiting, have very bad ear pain, have a very bad headache, feel dizzy, have a seizure, or have new hearing loss.  This information is not intended to replace advice given to you by your health care provider. Make sure you discuss any questions you have with your health care provider.  Document Revised: 02/01/2022 Document Reviewed: 02/01/2022  Elsevier Patient Education © 2024 [employer] Inc[employer]

## 2025-01-24 NOTE — PATIENT INSTRUCTIONS
Clinical References    Upper Respiratory Infection, Adult  An upper respiratory infection (URI) affects the nose, throat, and upper airways that lead to the lungs. The most common type of URI is often called the common cold. URIs usually get better on their own, without medical treatment.  What are the causes?  A URI is caused by a germ (virus). You may catch these germs by:  Breathing in droplets from an infected person's cough or sneeze.  Touching something that has the germ on it (is contaminated) and then touching your mouth, nose, or eyes.  What increases the risk?  You are more likely to get a URI if:  You are very young or very old.  You have close contact with others, such as at work, school, or a health care facility.  You smoke.  You have long-term (chronic) heart or lung disease.  You have a weakened disease-fighting system (immune system).  You have nasal allergies or asthma.  You have a lot of stress.  You have poor nutrition.  What are the signs or symptoms?  Runny or stuffy (congested) nose.  Cough.  Sneezing.  Sore throat.  Headache.  Feeling tired (fatigue).  Fever.  Not wanting to eat as much as usual.  Pain in your forehead, behind your eyes, and over your cheekbones (sinus pain).  Muscle aches.  Redness or irritation of the eyes.  Pressure in the ears or face.  How is this treated?  URIs usually get better on their own within 7-10 days. Medicines cannot cure URIs, but your doctor may recommend certain medicines to help relieve symptoms, such as:  Over-the-counter cold medicines.  Medicines to reduce coughing (cough suppressants). Coughing is a type of defense against infection that helps to clear the nose, throat, windpipe, and lungs (respiratory system). Take these medicines only as told by your doctor.  Medicines to lower your fever.  Follow these instructions at home:  Activity  Rest as needed.  If you have a fever, stay home from work or school until your fever is gone, or until your doctor  says you may return to work or school.  You should stay home until you cannot spread the infection anymore (you are not contagious).  Your doctor may have you wear a face mask so you have less risk of spreading the infection.  Relieving symptoms  Rinse your mouth often with salt water. To make salt water, dissolve ½-1 tsp (3-6 g) of salt in 1 cup (237 mL) of warm water.  Use a cool-mist humidifier to add moisture to the air. This can help you breathe more easily.  Eating and drinking    Drink enough fluid to keep your pee (urine) pale yellow.  Eat soups and other clear broths.  General instructions    Take over-the-counter and prescription medicines only as told by your doctor.  Do not smoke or use any products that contain nicotine or tobacco. If you need help quitting, ask your doctor.  Avoid being where people are smoking (avoid secondhand smoke).  Stay up to date on all your shots (immunizations), and get the flu shot every year.  Keep all follow-up visits.  How to prevent the spread of infection to others    Wash your hands with soap and water for at least 20 seconds. If you cannot use soap and water, use hand .  Avoid touching your mouth, face, eyes, or nose.  Cough or sneeze into a tissue or your sleeve or elbow. Do not cough or sneeze into your hand or into the air.  Contact a doctor if:  You are getting worse, not better.  You have any of these:  A fever or chills.  Brown or red mucus in your nose.  Yellow or brown fluid (discharge)coming from your nose.  Pain in your face, especially when you bend forward.  Swollen neck glands.  Pain when you swallow.  White areas in the back of your throat.  Get help right away if:  You have shortness of breath that gets worse.  You have very bad or constant:  Headache.  Ear pain.  Pain in your forehead, behind your eyes, and over your cheekbones (sinus pain).  Chest pain.  You have long-lasting (chronic) lung disease along with any of these:  Making high-pitched  whistling sounds when you breathe, most often when you breathe out (wheezing).  Long-lasting cough (more than 14 days).  Coughing up blood.  A change in your usual mucus.  You have a stiff neck.  You have changes in your:  Vision.  Hearing.  Thinking.  Mood.  These symptoms may be an emergency. Get help right away. Call 911.  Do not wait to see if the symptoms will go away.  Do not drive yourself to the hospital.  Summary  An upper respiratory infection (URI) is caused by a germ (virus). The most common type of URI is often called the common cold.  URIs usually get better within 7-10 days.  Take over-the-counter and prescription medicines only as told by your doctor.  This information is not intended to replace advice given to you by your health care provider. Make sure you discuss any questions you have with your health care provider.  Document Revised: 07/20/2022 Document Reviewed: 07/20/2022  [employer] Patient Education © 2024 [employer]  Sinus Infection, Adult  A sinus infection is soreness and swelling (inflammation) of your sinuses. Sinuses are hollow spaces in the bones around your face. They are located:  Around your eyes.  In the middle of your forehead.  Behind your nose.  In your cheekbones.  Your sinuses and nasal passages are lined with a fluid called mucus. Mucus drains out of your sinuses. Swelling can trap mucus in your sinuses. This lets germs (bacteria, virus, or fungus) grow, which leads to infection. Most of the time, this condition is caused by a virus.  What are the causes?  Allergies.  Asthma.  Germs.  Things that block your nose or sinuses.  Growths in the nose (nasal polyps).  Chemicals or irritants in the air.  A fungus. This is rare.  What increases the risk?  Having a weak body defense system (immune system).  Doing a lot of swimming or diving.  Using nasal sprays too much.  Smoking.  What are the signs or symptoms?  The main symptoms of this condition are pain and a feeling of pressure  around the sinuses. Other symptoms include:  Stuffy nose (congestion). This may make it hard to breathe through your nose.  Runny nose (drainage).  Soreness, swelling, and warmth in the sinuses.  A cough that may get worse at night.  Being unable to smell and taste.  Mucus that collects in the throat or the back of the nose (postnasal drip). This may cause a sore throat or bad breath.  Being very tired (fatigued).  A fever.  How is this diagnosed?  Your symptoms.  Your medical history.  A physical exam.  Tests to find out if your condition is short-term (acute) or long-term (chronic). Your doctor may:  Check your nose for growths (polyps).  Check your sinuses using a tool that has a light on one end (endoscope).  Check for allergies or germs.  Do imaging tests, such as an MRI or CT scan.  How is this treated?  Treatment for this condition depends on the cause and whether it is short-term or long-term.  If caused by a virus, your symptoms should go away on their own within 10 days. You may be given medicines to relieve symptoms. They include:  Medicines that shrink swollen tissue in the nose.  A spray that treats swelling of the nostrils.  Rinses that help get rid of thick mucus in your nose (nasal saline washes).  Medicines that treat allergies (antihistamines).  Over-the-counter pain relievers.  If caused by bacteria, your doctor may wait to see if you will get better without treatment. You may be given antibiotic medicine if you have:  A very bad infection.  A weak body defense system.  If caused by growths in the nose, surgery may be needed.  Follow these instructions at home:  Medicines  Take, use, or apply over-the-counter and prescription medicines only as told by your doctor. These may include nasal sprays.  If you were prescribed an antibiotic medicine, take it as told by your doctor. Do not stop taking it even if you start to feel better.  Hydrate and humidify    Drink enough water to keep your pee (urine)  pale yellow.  Use a cool mist humidifier to keep the humidity level in your home above 50%.  Breathe in steam for 10-15 minutes, 3-4 times a day, or as told by your doctor. You can do this in the bathroom while a hot shower is running.  Try not to spend time in cool or dry air.  Rest  Rest as much as you can.  Sleep with your head raised (elevated).  Make sure you get enough sleep each night.  General instructions    Put a warm, moist washcloth on your face 3-4 times a day, or as often as told by your doctor.  Use nasal saline washes as often as told by your doctor.  Wash your hands often with soap and water. If you cannot use soap and water, use hand .  Do not smoke. Avoid being around people who are smoking (secondhand smoke).  Keep all follow-up visits.  Contact a doctor if:  You have a fever.  Your symptoms get worse.  Your symptoms do not get better within 10 days.  Get help right away if:  You have a very bad headache.  You cannot stop vomiting.  You have very bad pain or swelling around your face or eyes.  You have trouble seeing.  You feel confused.  Your neck is stiff.  You have trouble breathing.  These symptoms may be an emergency. Get help right away. Call 911.  Do not wait to see if the symptoms will go away.  Do not drive yourself to the hospital.  Summary  A sinus infection is swelling of your sinuses. Sinuses are hollow spaces in the bones around your face.  This condition is caused by tissues in your nose that become inflamed or swollen. This traps germs. These can lead to infection.  If you were prescribed an antibiotic medicine, take it as told by your doctor. Do not stop taking it even if you start to feel better.  Keep all follow-up visits.  This information is not intended to replace advice given to you by your health care provider. Make sure you discuss any questions you have with your health care provider.  Document Revised: 11/22/2022 Document Reviewed: 11/22/2022  [employer] Patient  Education © 2024 [employer]

## 2025-01-30 NOTE — PATIENT INSTRUCTIONS
Clinical References    Otitis Media, Adult    Otitis media is a condition in which the middle ear is red and swollen (inflamed) and full of fluid. The middle ear is the part of the ear that contains bones for hearing as well as air that helps send sounds to the brain. The condition usually goes away on its own.  What are the causes?  This condition is caused by a blockage in the eustachian tube. This tube connects the middle ear to the back of the nose. It normally allows air into the middle ear. The blockage is caused by fluid or swelling. Problems that can cause blockage include:  A cold or infection that affects the nose, mouth, or throat.  Allergies.  An irritant, such as tobacco smoke.  Adenoids that have become large. The adenoids are soft tissue located in the back of the throat, behind the nose and the roof of the mouth.  Growth or swelling in the upper part of the throat, just behind the nose (nasopharynx).  Damage to the ear caused by a change in pressure. This is called barotrauma.  What increases the risk?  You are more likely to develop this condition if you:  Smoke or are exposed to tobacco smoke.  Have an opening in the roof of your mouth (cleft palate).  Have acid reflux.  Have problems in your body's defense system (immune system).  What are the signs or symptoms?  Symptoms of this condition include:  Ear pain.  Fever.  Problems with hearing.  Being tired.  Fluid leaking from the ear.  Ringing in the ear.  How is this treated?  This condition can go away on its own within 3-5 days. But if the condition is caused by germs (bacteria) and does not go away on its own, or if it keeps coming back, your doctor may:  Give you antibiotic medicines.  Give you medicines for pain.  Follow these instructions at home:  Take over-the-counter and prescription medicines only as told by your doctor.  If you were prescribed an antibiotic medicine, take it as told by your doctor. Do not stop taking it even if you  start to feel better.  Keep all follow-up visits.  Contact a doctor if:  You have bleeding from your nose.  There is a lump on your neck.  You are not feeling better in 5 days.  You feel worse instead of better.  Get help right away if:  You have pain that is not helped with medicine.  You have swelling, redness, or pain around your ear.  You get a stiff neck.  You cannot move part of your face (paralysis).  You notice that the bone behind your ear hurts when you touch it.  You get a very bad headache.  Summary  Otitis media means that the middle ear is red, swollen, and full of fluid.  This condition usually goes away on its own.  If the problem does not go away, treatment may be needed. You may be given medicines to treat the infection or to treat your pain.  If you were prescribed an antibiotic medicine, take it as told by your doctor. Do not stop taking it even if you start to feel better.  Keep all follow-up visits.  This information is not intended to replace advice given to you by your health care provider. Make sure you discuss any questions you have with your health care provider.  Document Revised: 03/28/2022 Document Reviewed: 03/28/2022  [employer] Patient Education © 2024 [employer]  Ear Drainage  Ear drainage means that earwax, pus, blood, or other fluids come out of the ear.  Follow these instructions at home:  Watch for changes in your ear drainage. Let your doctor know about them. Take these actions to help relieve your symptoms.  Protect your ear    Do not use cotton-tipped swabs in your ear. Do not put any other objects into your ear.  Do not swim until your doctor says it is okay.  Before you shower, cover a cotton ball with petroleum jelly and put that into your ear. This helps to keep water out of your ear.  Wash your hands with soap and water for 20 seconds before and after you touch your ears.  General instructions  Take over-the-counter and prescription medicines only as told by your  doctor. Finish all antibiotic medicine even when you start to feel better.  Avoid being around tobacco smoke.  Keep all follow-up visits.  Contact a doctor if:  You have more drainage.  You have ear pain.  You have a fever.  Your drainage is not getting better with treatment.  Your ear drainage is bloody, white, clear, or yellow.  Your ear is red or swollen.  Get help right away if:  You have very bad ear pain.  You have a very bad headache.  You vomit.  You feel dizzy.  You have a seizure.  You have new hearing loss.  These symptoms may be an emergency. Get help right away. Call your local emergency services (911 in the [place]  Do not wait to see if the symptoms will go away.  Do not drive yourself to the hospital.  Summary  Ear drainage means that earwax, pus, blood, or other fluids come out of the ear.  Watch for changes in your symptoms. Tell your doctor about them. Follow what your doctor tells you to do.  Talk to your doctor if you have more drainage, bloody drainage, ear pain, a fever, or swelling.  Get help right away if you are vomiting, have very bad ear pain, have a very bad headache, feel dizzy, have a seizure, or have new hearing loss.  This information is not intended to replace advice given to you by your health care provider. Make sure you discuss any questions you have with your health care provider.  Document Revised: 02/01/2022 Document Reviewed: 02/01/2022  [employer] Patient Education © 2024 [employer]

## 2025-02-19 ENCOUNTER — E-VISIT (OUTPATIENT)
Dept: FAMILY MEDICINE CLINIC | Facility: TELEHEALTH | Age: 35
End: 2025-02-19
Payer: COMMERCIAL

## 2025-02-19 NOTE — E-VISIT TREATED
Date: 2025 11:38:01  Clinician: Keiry Ventura  Clinician NPI: [id number]  Patient: Marian Saeed  Patient : 1990  Patient Address: [address] [place] [address]  Patient Phone: [phone]  Visit Protocol: URI  Patient Summary:  Marian is a 34 year old ( : 1990 ) female who initiated a visit for cold, sinus infection, or influenza.     Marian states the symptoms started 1-2 days ago.   Symptom start date: 2025   The symptoms consist of a   headache, enlarged lymph nodes, facial pain or pressure, malaise, myalgia, a sore throat, tooth pain, nasal congestion, chills, and a cough. Marian also feels feverish.   Symptom details     Nasal secretions: The color of the mucus is green, clear, and white.    Cough: Marian coughs every 5-10 minutes and the cough is more bothersome at night. Phlegm comes into the throat when coughing. Marian does not believe the cough is caused by post-nasal drip. The color of the phlegm is white, clear, and green.     Sore throat: Marian reports having moderate throat pain (4-6 on a 10 point pain scale), does not have exudate on the tonsils, and can swallow liquids with mild discomfort. The lymph nodes in the neck are enlarged. The lymph node swelling is not worse   on one side and the swelling has caused changes in speech or hoarseness in the voice. A rash has not appeared on the skin since the sore throat started. Patient reports feeling pain in the front of the neck that sometimes moves to the ear.     Temperature: Current temperature is 100.2 degrees Fahrenheit. Marian has had a temperature over 100 degrees Fahrenheit for 1-2 days.     Facial pain or pressure: The facial pain or pressure does not feel worse when bending or leaning forward.     Headache: The headache is mild (1-3 on a 10 point pain scale).     Tooth pain: The tooth pain is not caused by a cavity, recent dental work, or other mouth problems.      Marian  denies having wheezing, ear pain, rhinitis, vomiting, anosmia and ageusia, nausea, and diarrhea. Marian also denies having recent facial or sinus surgery in the past 60 days, experiencing difficulty opening their mouth due to pain or   swelling in the jaw muscles, and taking antibiotic medication in the past month. Marian is not experiencing dyspnea.   Precipitating events  Within the past week, Marian has not been exposed to someone with strep throat. Marian has recently been   exposed to someone with influenza. Marian has been in close contact with the following high risk individuals: children under the age of 5.    Marian has not received the influenza vaccination.   Pertinent COVID-19 (Coronavirus) information  Since   the symptoms started, Marian has tested for COVID-19. The result of the test was negative. The negative result   was more than 48 hours ago. Mraian was not able to re-test today.   Marian does not need a COVID-19 test.   Marian has not received a COVID-19 vaccine in the past year.   Pertinent medical history     Marian had 1 sinus infection within the   past year.   Marian typically gets a yeast infection when an antibiotic is taken. Marian has successfully used Diflucan to treat previous yeast infections. 2 doses of fluconazole (Diflucan) has typically been needed for symptoms to resolve in the   past.  A provider has not told Marian to avoid NSAIDs.   Marian does not have diabetes. Marian denies having immunosuppressive conditions (e.g., chemotherapy, HIV, organ transplant, long-term use of steroids or other immunosuppressive   medications, splenectomy). Marian does not have asthma.   Marian denies having chronic lung disease, cystic fibrosis, hypertension, long-term disabilities, mental health conditions, sickle cell disease or thalassemia, stroke or other cardiovascular   disease, substance use disorders, or tuberculosis (TB).  Marian  does not smoke or use smokeless tobacco. Marian does not vape or use other e-cigarette products.   Marian denies pregnancy and denies breastfeeding.   Weight: 126 lbs (57.15 kg)    MEDICATIONS: Tylenol Extra Strength oral, fluticasone propionate nasal, sumatriptan oral, ALLERGIES: NKDA  Clinician Response:  Dear Marian,  Based on the information provided, you have influenza (the flu). The flu is a very contagious infection that can lead to a serious illness in anyone, but some are at risk for becoming more sick than others. For this   reason, it is important to know you have the flu so you can take steps to prevent spreading it to others.  Medication information  I am prescribing:       Oseltamivir (Tamiflu) 75 mg oral capsule. Take 1 capsule by mouth 2 times per day for 5 days. There are no refills with this prescription.      Brompheniramine-pseudoeph-DM (Bromfed DM) 2-30-10 mg/5mL oral syrup. Take 10 milliliters by mouth every 4 hours as needed for cough. Do not take more than 60mL in 24 hours. There are no refills with this prescription.     Self care  Steps you can take to be as comfortable as possible:     Rest.    Drink plenty of fluids.    Take a warm shower to loosen congestion.    Use a cool-mist humidifier.    Use throat lozenges.    Suck on frozen items such as popsicles.    Drink hot tea with lemon and honey.    Gargle with warm salt water (1/4 teaspoon of salt per 8 ounce glass of water).    Take a spoonful of honey to reduce your cough.     If you have a fever, stay home until your temperature has returned to normal for 24 hours and you feel well enough for daily activities. And of course, wash your hands often to prevent spreading the flu and other illnesses. However, the best way to   prevent the flu is to get a flu shot before each flu season.  When to seek care  Please be seen in a clinic or urgent care if any of the following occur:   New symptoms develop, or symptoms become worse   Call  911 or go to the emergency room if any of   the following occur:     Difficulty breathing    If you feel that your throat is closing off    Suddenly develop a rash    Unable to swallow fluids or are drooling     For the latest updates on COVID-19 (Coronavirus), please visit the Centers for Disease Control and Prevention (CDC). Also, your state and local health department websites may provide additional guidance regarding testing and isolation recommendations   for your location.   Diagnosis: Influenza  Diagnosis ICD: J11.1    Follow up instructions:  ATTENTION: If you have been prescribed medications, your prescriptions will not be sent until you choose your pharmacy. To do so open the link within your notification, or go to [employer] and click eVisit in the menu to open your   treatment plan. From there, you can select your pharmacy at the bottom of your after visit summary. You can also go to [email or web address]   Prescriptions  Prescription: oseltamivir (Tamiflu) 75 mg oral capsule, take 1 capsule by mouth 2 times per day for 5 days  Sent To: [facility] - [id number] - [address], [facility] [address], [place] [address]  Prescription: brompheniramine-pseudoeph-DM (Bromfed DM) 2-30-10 mg/5 mL oral syrup, take 10 milliliters by mouth every 4 hours as needed for cough  [facility] [id number] [facility] [address], [facility] [address], [place] [address]

## 2025-03-24 ENCOUNTER — OFFICE VISIT (OUTPATIENT)
Dept: INTERNAL MEDICINE | Facility: CLINIC | Age: 35
End: 2025-03-24
Payer: COMMERCIAL

## 2025-03-24 VITALS
BODY MASS INDEX: 19.93 KG/M2 | WEIGHT: 127 LBS | OXYGEN SATURATION: 100 % | DIASTOLIC BLOOD PRESSURE: 74 MMHG | TEMPERATURE: 97.1 F | HEIGHT: 67 IN | SYSTOLIC BLOOD PRESSURE: 108 MMHG | HEART RATE: 51 BPM

## 2025-03-24 DIAGNOSIS — R59.1 LYMPHADENOPATHY: Primary | Chronic | ICD-10-CM

## 2025-03-24 PROCEDURE — 99213 OFFICE O/P EST LOW 20 MIN: CPT

## 2025-03-24 NOTE — PROGRESS NOTES
Chief Complaint  Adenopathy (Bilateral and saw [facility] this year and everything was fine any additional concerns to follow up with PCP regarding change in size )     Subjective:      History of Present Illness {CC  Problem List  Visit  Diagnosis   Encounters  Notes  Medications  Labs  Result Review Imaging  Media :23}     Marian Saeed presents to [facility] for:    Patient or patient representative verbalized consent for the use of Ambient Listening during the visit with  GILL Perez for chart documentation. 4/6/2025  14:25 EDT     History of Present Illness      The patient presents for evaluation of swollen lymph nodes.    She reports persistent swelling of her lymph nodes, with new nodes also exhibiting swelling. This has been a cause of concern for her. She sought consultation from an ENT specialist approximately a month ago, who recommended further evaluation by her primary care physician. The ENT specialist did not identify any polyps or other abnormalities. The lymph nodes located under her ear and in the neck region have been swollen since the RSV outbreak. She also contracted influenza during this period. She is uncertain if her immune system is responding appropriately. She has observed new lymph nodes in the anterior part of her neck. She reports no difficulty in swallowing, fevers, chills, or night sweats.         I have reviewed patient's medical history, any new submitted information provided by patient or medical assistant and updated medical record.      Objective:      Physical Exam  Vitals reviewed.   Constitutional:       General: She is awake. She is not in acute distress.     Appearance: Normal appearance. She is not ill-appearing, toxic-appearing or diaphoretic.   HENT:      Head: Normocephalic.      Right Ear: Hearing normal.      Left Ear: Hearing normal.      Nose: Nose normal.      Mouth/Throat:      Lips: Pink.       Mouth: Mucous membranes are moist.   Eyes:      General: Lids are normal. Vision grossly intact.      Conjunctiva/sclera: Conjunctivae normal.   Neck:        Comments: Palpable marble size swollen hard lymph node   Cardiovascular:      Rate and Rhythm: Normal rate and regular rhythm.      Heart sounds: Normal heart sounds.   Pulmonary:      Effort: Pulmonary effort is normal.      Breath sounds: Normal breath sounds.   Abdominal:      General: Bowel sounds are normal.   Musculoskeletal:      Cervical back: Full passive range of motion without pain. No edema, erythema, signs of trauma, rigidity, torticollis or crepitus. No pain with movement. Normal range of motion.   Lymphadenopathy:      Head:      Right side of head: No submental, submandibular, tonsillar, preauricular, posterior auricular or occipital adenopathy.      Left side of head: Occipital adenopathy present. No submental, submandibular, tonsillar, preauricular or posterior auricular adenopathy.      Cervical: Cervical adenopathy present.      Right cervical: No superficial, deep or posterior cervical adenopathy.     Left cervical: Superficial cervical adenopathy and deep cervical adenopathy present. No posterior cervical adenopathy.   Skin:     General: Skin is warm and dry.      Capillary Refill: Capillary refill takes less than 2 seconds.   Neurological:      General: No focal deficit present.      Mental Status: She is alert and oriented to person, place, and time. Mental status is at baseline.   Psychiatric:         Attention and Perception: Attention and perception normal.         Mood and Affect: Mood and affect normal.         Speech: Speech normal.         Behavior: Behavior normal. Behavior is cooperative.         Cognition and Memory: Cognition and memory normal.         Judgment: Judgment normal.        Result Review  Data Reviewed:{ Labs  Result Review  Imaging  Med Tab  Media :23}     Results         The following data was reviewed by:  "Hina Valdovinos, APRN on 03/24/2025  CMP          8/26/2024    13:10 3/24/2025    14:46   CMP   Glucose 99  85    BUN 10  12    Creatinine 0.68  0.91    EGFR 119.6  86.1    Sodium 138  141    Potassium 4.4  4.6    Chloride 101  103    Calcium 9.7  9.6    Total Protein 6.9  6.9    Albumin 4.5  4.7    Globulin 2.4  2.2    Total Bilirubin 0.4  0.3    Alkaline Phosphatase 53  68    AST (SGOT) 14  19    ALT (SGPT) 9  11    Albumin/Globulin Ratio 1.9  2.1    BUN/Creatinine Ratio 14.7  13.2      CBC w/diff          8/26/2024    13:10 3/24/2025    14:46   CBC w/Diff   WBC 5.69  5.15    RBC 4.08  4.08    Hemoglobin 13.1  13.0    Hematocrit 39.9  40.6    MCV 97.8  99.5    MCH 32.1  31.9    MCHC 32.8  32.0    RDW 11.1  11.5    Platelets 273  235    Neutrophil Rel % 42.8  48.6    Lymphocyte Rel % 46.0  37.9    Monocyte Rel % 6.9  6.4    Eosinophil Rel % 3.0  5.2    Basophil Rel % 1.1  1.7      Lipid Panel          8/26/2024    13:10   Lipid Panel   Total Cholesterol 170    Triglycerides 68    HDL Cholesterol 74    VLDL Cholesterol 13    LDL Cholesterol  83      TSH          8/26/2024    13:10   TSH   TSH 2.920             Vital Signs:   /74 (BP Location: Right arm, Patient Position: Sitting, Cuff Size: Adult)   Pulse 51   Temp 97.1 °F (36.2 °C) (Temporal)   Ht 170.2 cm (67.01\")   Wt 57.6 kg (127 lb)   SpO2 100%   BMI 19.89 kg/m²     BMI is within normal parameters. No other follow-up for BMI required.             Requested Prescriptions      No prescriptions requested or ordered in this encounter       Routine medications provided by this office will also be refilled via pharmacy request.       Current Outpatient Medications:     fluticasone (FLONASE) 50 MCG/ACT nasal spray, Administer 2 sprays into the nostril(s) as directed by provider Daily., Disp: 32 g, Rfl: 0    SUMAtriptan (IMITREX) 25 MG tablet, , Disp: , Rfl:     amoxicillin-clavulanate (AUGMENTIN) 875-125 MG per tablet, Take 1 tablet by mouth 2 (Two) Times " a Day., Disp: 14 tablet, Rfl: 0     Assessment and Plan:      Assessment and Plan {CC Problem List  Visit Diagnosis  ROS  Review (Popup)  Health Maintenance  Quality  BestPractice  Medications  SmartSets  SnapShot Encounters  Media :23}     Problem List Items Addressed This Visit    None  Visit Diagnoses         Lymphadenopathy  (Chronic)   -  Primary    Relevant Orders    CBC w AUTO Differential (Completed)    Comprehensive metabolic panel (Completed)    US Head Neck Soft Tissue (Completed)                 1. Cervical lymphadenopathy.  She reports swollen lymph nodes under her ear and neck area, persisting since an RSV infection and subsequent flu months ago. New lymph nodes in the front part of her neck have also been noted. An ultrasound of the left side of her neck will be conducted to further investigate the adenopathy. Additionally, laboratory tests will be performed to rule out any underlying conditions.     Today we have placed a referral or ordered further testing:     A team member from [facility] will contact you within the next 3-5 business days to schedule your tests or referral.    If you have not heard them within this time frame, they can be contacted at [phone]    If it was an outside referral: contact our office if you have not been contacted for appointment after 7-10 business days.      Thank you for allowing us to care for you,  GILL Perez    Follow Up {Instructions Charge Capture  Follow-up Communications :23}     No follow-ups on file.      Patient was given instructions and counseling regarding her condition or for health maintenance advice. Please see specific information pulled into the AVS if appropriate.        Dragon disclaimer:   Much of this encounter note is an electronic transcription/translation of spoken language to printed text. The electronic translation of spoken language may permit erroneous, or at times, nonsensical words or  phrases to be inadvertently transcribed; Although I have reviewed the note for such errors, some may still exist.     Additional Patient Counseling:       Patient Instructions   Lymphadenopathy    Lymphadenopathy is when your lymph glands are swollen or larger than normal.   Lymph glands, also called lymph nodes, are clumps of tissue. They filter germs and waste from tissues in your body to your bloodstream. They're part of your body's defense system, or immune system.  Lymphadenopathy has different causes, like infection, autoimmune disease, and cancer. Lymphadenopathy can happen wherever you have lymph nodes. The type you have depends on which nodes it's in, such as:  Cervical lymphadenopathy. This is in the neck.  Mediastinal lymphadenopathy. This is in the chest.  Hilar lymphadenopathy. This is in the lungs.  Axillary lymphadenopathy. This is in the armpits.  Inguinal lymphadenopathy. This is in the groin.  Sometimes, fluid and cells that fight infection build up in your lymph nodes. This happens when your immune system reacts to germs or other substances that get into your body. This makes lymph nodes swell and get bigger.  Treatment is based on what's thought to be the cause. Sometimes, lymph nodes don't go back to normal size after treatment. If yours don't, your health care provider may order tests to help learn why your glands are still swollen and big.  Follow these instructions at home:    Take over-the-counter and prescription medicines only as told by your provider.  If you were prescribed antibiotics, do not stop using them, even if you start to feel better.  If told, apply heat to swollen lymph nodes as told by your provider. Use the heat source that your provider recommends, such as a moist heat pack or a heating pad.  Place a towel between your skin and the heat source.  Leave the heat on only for the time told by your provider to avoid injury.  If your skin turns bright red, remove the heat right  away to prevent burns. The risk of burns is higher if you cannot feel pain, heat, or cold.  Check your swollen lymph nodes every day for changes. Check other places where you have lymph nodes as told. Check for changes such as:  More swelling.  Sudden growth in size.  Redness or pain.  Hardness.  Contact a health care provider if:  You have lymph nodes that:  Are still swollen after 2 weeks.  Have gotten bigger all of a sudden or the swelling spreads.  Are red, painful, or hard.  Fluid leaks from the skin near a swollen lymph node.  You get a fever, chills, or night sweats.  You feel tired.  You have a sore throat.  Your abdomen hurts.  You lose weight without trying.  This information is not intended to replace advice given to you by your health care provider. Make sure you discuss any questions you have with your health care provider.  Document Revised: 03/13/2024 Document Reviewed: 03/13/2024  [employer] Patient Education © 2024 [employer]

## 2025-03-25 ENCOUNTER — OFFICE VISIT (OUTPATIENT)
Dept: INTERNAL MEDICINE | Facility: CLINIC | Age: 35
End: 2025-03-25
Payer: COMMERCIAL

## 2025-03-25 VITALS
HEIGHT: 67 IN | DIASTOLIC BLOOD PRESSURE: 70 MMHG | OXYGEN SATURATION: 100 % | TEMPERATURE: 98.1 F | BODY MASS INDEX: 19.93 KG/M2 | SYSTOLIC BLOOD PRESSURE: 118 MMHG | HEART RATE: 70 BPM | WEIGHT: 127 LBS

## 2025-03-25 DIAGNOSIS — J02.9 PHARYNGITIS, UNSPECIFIED ETIOLOGY: ICD-10-CM

## 2025-03-25 DIAGNOSIS — Z78.9 VEGETARIAN DIET: ICD-10-CM

## 2025-03-25 DIAGNOSIS — J02.9 SORE THROAT: Primary | ICD-10-CM

## 2025-03-25 LAB
ALBUMIN SERPL-MCNC: 4.7 G/DL (ref 3.5–5.2)
ALBUMIN/GLOB SERPL: 2.1 G/DL
ALP SERPL-CCNC: 68 U/L (ref 39–117)
ALT SERPL-CCNC: 11 U/L (ref 1–33)
AST SERPL-CCNC: 19 U/L (ref 1–32)
BASOPHILS # BLD AUTO: 0.09 10*3/MM3 (ref 0–0.2)
BASOPHILS NFR BLD AUTO: 1.7 % (ref 0–1.5)
BILIRUB SERPL-MCNC: 0.3 MG/DL (ref 0–1.2)
BUN SERPL-MCNC: 12 MG/DL (ref 6–20)
BUN/CREAT SERPL: 13.2 (ref 7–25)
CALCIUM SERPL-MCNC: 9.6 MG/DL (ref 8.6–10.5)
CHLORIDE SERPL-SCNC: 103 MMOL/L (ref 98–107)
CO2 SERPL-SCNC: 27 MMOL/L (ref 22–29)
CREAT SERPL-MCNC: 0.91 MG/DL (ref 0.57–1)
EGFRCR SERPLBLD CKD-EPI 2021: 86.1 ML/MIN/1.73
EOSINOPHIL # BLD AUTO: 0.27 10*3/MM3 (ref 0–0.4)
EOSINOPHIL NFR BLD AUTO: 5.2 % (ref 0.3–6.2)
ERYTHROCYTE [DISTWIDTH] IN BLOOD BY AUTOMATED COUNT: 11.5 % (ref 12.3–15.4)
EXPIRATION DATE: NORMAL
GLOBULIN SER CALC-MCNC: 2.2 GM/DL
GLUCOSE SERPL-MCNC: 85 MG/DL (ref 65–99)
HCT VFR BLD AUTO: 40.6 % (ref 34–46.6)
HGB BLD-MCNC: 13 G/DL (ref 12–15.9)
IMM GRANULOCYTES # BLD AUTO: 0.01 10*3/MM3 (ref 0–0.05)
IMM GRANULOCYTES NFR BLD AUTO: 0.2 % (ref 0–0.5)
INTERNAL CONTROL: NORMAL
LYMPHOCYTES # BLD AUTO: 1.95 10*3/MM3 (ref 0.7–3.1)
LYMPHOCYTES NFR BLD AUTO: 37.9 % (ref 19.6–45.3)
Lab: NORMAL
MCH RBC QN AUTO: 31.9 PG (ref 26.6–33)
MCHC RBC AUTO-ENTMCNC: 32 G/DL (ref 31.5–35.7)
MCV RBC AUTO: 99.5 FL (ref 79–97)
MONOCYTES # BLD AUTO: 0.33 10*3/MM3 (ref 0.1–0.9)
MONOCYTES NFR BLD AUTO: 6.4 % (ref 5–12)
NEUTROPHILS # BLD AUTO: 2.5 10*3/MM3 (ref 1.7–7)
NEUTROPHILS NFR BLD AUTO: 48.6 % (ref 42.7–76)
NRBC BLD AUTO-RTO: 0 /100 WBC (ref 0–0.2)
PLATELET # BLD AUTO: 235 10*3/MM3 (ref 140–450)
POTASSIUM SERPL-SCNC: 4.6 MMOL/L (ref 3.5–5.2)
PROT SERPL-MCNC: 6.9 G/DL (ref 6–8.5)
RBC # BLD AUTO: 4.08 10*6/MM3 (ref 3.77–5.28)
S PYO AG THROAT QL: NEGATIVE
SODIUM SERPL-SCNC: 141 MMOL/L (ref 136–145)
WBC # BLD AUTO: 5.15 10*3/MM3 (ref 3.4–10.8)

## 2025-03-25 PROCEDURE — 87880 STREP A ASSAY W/OPTIC: CPT

## 2025-03-25 PROCEDURE — 99213 OFFICE O/P EST LOW 20 MIN: CPT

## 2025-03-25 NOTE — PROGRESS NOTES
Chief Complaint  Sore Throat (Rt nodule present x2 x24 hours )     Subjective:      History of Present Illness {CC  Problem List  Visit  Diagnosis   Encounters  Notes  Medications  Labs  Result Review Imaging  Media :23}     Marian Saeed presents to [facility] for:    Patient or patient representative verbalized consent for the use of Ambient Listening during the visit with  GILL Perez for chart documentation. 4/8/2025  13:28 EDT     History of Present Illness      The patient presents for evaluation of a sore throat and iron deficiency.    She reports experiencing intermittent night sweats and has identified the emergence of 2 additional lymph nodes. She is scheduled for an ultrasound scan tomorrow. She expresses concern about the possibility of mononucleosis, given her history of various viral infections. She has observed erythema in her throat this morning, accompanied by soreness. She also reports neck soreness but does not experience significant fatigue. She maintains regular dental hygiene practices, including teeth cleaning within the past year. She has previously been treated with Z-Rush and cefdinir.    She has recently transitioned from a vegetarian diet to an animal-based diet and is curious about any potential changes in her iron levels. She reports an improvement in her energy levels following this dietary change.    MEDICATIONS  Past: Z-Rush, cefdinir     Past Medical History:   Diagnosis Date    Migraine     PCOS (polycystic ovarian syndrome)     Urticaria, dermatographic      Family History   Problem Relation Age of Onset    Thyroid disease Mother     Migraines Mother     Hypertension Father     Atrial fibrillation Father     Diabetes Paternal Grandmother      Social History     Tobacco Use   Smoking Status Never   Smokeless Tobacco Never     Social History     Substance and Sexual Activity   Alcohol Use Not Currently    Alcohol/week: 1.0  standard drink of alcohol         I have reviewed patient's medical history, any new submitted information provided by patient or medical assistant and updated medical record.      Objective:      Physical Exam  Vitals reviewed.   Constitutional:       General: She is awake. She is not in acute distress.     Appearance: Normal appearance. She is well-groomed. She is not ill-appearing, toxic-appearing or diaphoretic.   HENT:      Head: Normocephalic.      Right Ear: Hearing normal.      Left Ear: Hearing normal.      Nose: Nose normal. Congestion present. No nasal tenderness, mucosal edema or rhinorrhea.      Mouth/Throat:      Lips: Pink.      Mouth: Mucous membranes are moist.      Pharynx: Pharyngeal swelling, posterior oropharyngeal erythema and postnasal drip present. No oropharyngeal exudate or uvula swelling.   Eyes:      General: Lids are normal. Vision grossly intact.      Conjunctiva/sclera: Conjunctivae normal.   Cardiovascular:      Rate and Rhythm: Normal rate and regular rhythm.      Heart sounds: Normal heart sounds.   Pulmonary:      Effort: Pulmonary effort is normal.      Breath sounds: Normal breath sounds.   Lymphadenopathy:      Cervical: Cervical adenopathy present.   Skin:     General: Skin is warm and dry.      Capillary Refill: Capillary refill takes less than 2 seconds.   Neurological:      General: No focal deficit present.      Mental Status: She is oriented to person, place, and time. Mental status is at baseline. She is lethargic.   Psychiatric:         Attention and Perception: Attention and perception normal.         Mood and Affect: Mood and affect normal.         Speech: Speech normal.         Behavior: Behavior normal. Behavior is cooperative.         Cognition and Memory: Cognition and memory normal.         Judgment: Judgment normal.        Result Review  Data Reviewed:{ Labs  Result Review  Imaging  Med Tab  Media :23}     Results  Laboratory Studies  Strep test is negative.  "White blood cell count is normal.       The following data was reviewed by: GILL Perez on 03/25/2025  CMP          8/26/2024    13:10 3/24/2025    14:46   CMP   Glucose 99  85    BUN 10  12    Creatinine 0.68  0.91    EGFR 119.6  86.1    Sodium 138  141    Potassium 4.4  4.6    Chloride 101  103    Calcium 9.7  9.6    Total Protein 6.9  6.9    Albumin 4.5  4.7    Globulin 2.4  2.2    Total Bilirubin 0.4  0.3    Alkaline Phosphatase 53  68    AST (SGOT) 14  19    ALT (SGPT) 9  11    Albumin/Globulin Ratio 1.9  2.1    BUN/Creatinine Ratio 14.7  13.2      CBC          8/26/2024    13:10 3/24/2025    14:46   CBC   WBC 5.69  5.15    RBC 4.08  4.08    Hemoglobin 13.1  13.0    Hematocrit 39.9  40.6    MCV 97.8  99.5    MCH 32.1  31.9    MCHC 32.8  32.0    RDW 11.1  11.5    Platelets 273  235      CBC w/diff          8/26/2024    13:10 3/24/2025    14:46   CBC w/Diff   WBC 5.69  5.15    RBC 4.08  4.08    Hemoglobin 13.1  13.0    Hematocrit 39.9  40.6    MCV 97.8  99.5    MCH 32.1  31.9    MCHC 32.8  32.0    RDW 11.1  11.5    Platelets 273  235    Neutrophil Rel % 42.8  48.6    Lymphocyte Rel % 46.0  37.9    Monocyte Rel % 6.9  6.4    Eosinophil Rel % 3.0  5.2    Basophil Rel % 1.1  1.7      Lipid Panel          8/26/2024    13:10   Lipid Panel   Total Cholesterol 170    Triglycerides 68    HDL Cholesterol 74    VLDL Cholesterol 13    LDL Cholesterol  83      TSH          8/26/2024    13:10   TSH   TSH 2.920      POCT rapid strep A (03/25/2025 1:35 PM)            Vital Signs:   /70 (BP Location: Left arm, Patient Position: Sitting, Cuff Size: Adult)   Pulse 70   Temp 98.1 °F (36.7 °C) (Oral)   Ht 170.2 cm (67.01\")   Wt 57.6 kg (127 lb)   SpO2 100%   BMI 19.89 kg/m²     BMI is within normal parameters. No other follow-up for BMI required.             Requested Prescriptions     Signed Prescriptions Disp Refills    amoxicillin-clavulanate (AUGMENTIN) 875-125 MG per tablet 14 tablet 0     Sig: Take 1 " tablet by mouth 2 (Two) Times a Day.       Routine medications provided by this office will also be refilled via pharmacy request.       Current Outpatient Medications:     fluticasone (FLONASE) 50 MCG/ACT nasal spray, Administer 2 sprays into the nostril(s) as directed by provider Daily., Disp: 32 g, Rfl: 0    SUMAtriptan (IMITREX) 25 MG tablet, , Disp: , Rfl:     amoxicillin-clavulanate (AUGMENTIN) 875-125 MG per tablet, Take 1 tablet by mouth 2 (Two) Times a Day., Disp: 14 tablet, Rfl: 0     Assessment and Plan:      Assessment and Plan {CC Problem List  Visit Diagnosis  ROS  Review (Popup)  Health Maintenance  Quality  BestPractice  Medications  SmartSets  SnapShot Encounters  Media :23}     Problem List Items Addressed This Visit    None  Visit Diagnoses         Sore throat    -  Primary    Relevant Orders    POCT rapid strep A (Completed)      Pharyngitis, unspecified etiology          Vegetarian diet        Relevant Orders    Iron and TIBC (Completed)    Ferritin (Completed)                 1. Pharyngitis.  The strep test returned negative results. Her white blood cell count is within the normal range, indicating no active infection as of yesterday. The presence of slightly enlarged cells is not a cause for concern. The inflammation observed is likely a reactive response to recent illnesses, which can persist for several weeks or months. There are no indications of mononucleosis. She has been advised to maintain adequate hydration and consume food prior to taking the medication to prevent stomach upset. A prescription for amoxicillin has been provided. An ultrasound has been ordered to further investigate the cause of the inflammation.    2. Iron deficiency.  Her hemoglobin and hematocrit levels are within normal limits. She has transitioned from a vegetarian diet to an animal-based diet, which may have contributed to the improvement in her energy levels. Iron studies will be conducted to assess  her current iron levels.    Follow-up  The patient will follow up in August.     Please review added information under the Patient Instructions portion of your print out.    Thank you for allowing us to care for you,  GILL Perez      Follow Up {Instructions Charge Capture  Follow-up Communications :23}     No follow-ups on file.      Patient was given instructions and counseling regarding her condition or for health maintenance advice. Please see specific information pulled into the AVS if appropriate.        Dragon disclaimer:   Much of this encounter note is an electronic transcription/translation of spoken language to printed text. The electronic translation of spoken language may permit erroneous, or at times, nonsensical words or phrases to be inadvertently transcribed; Although I have reviewed the note for such errors, some may still exist.     Additional Patient Counseling:       Patient Instructions     Pharyngitis    Pharyngitis is a sore throat (pharynx). This is when there is redness, pain, and swelling in your throat. Most of the time, this condition gets better on its own. In some cases, you may need medicine.  What are the causes?  An infection from a virus.  An infection from bacteria.  Allergies.  What increases the risk?  Being 5-24 years old.  Being in crowded environments. These include:  Daycares.  Schools.  Dormitories.  Living in a place with cold temperatures outside.  Having a weakened disease-fighting (immune) system.  What are the signs or symptoms?  Symptoms may vary depending on the cause. Common symptoms include:  Sore throat.  Tiredness (fatigue).  Low-grade fever.  Stuffy nose.  Cough.  Headache.  Other symptoms may include:  Glands in the neck (lymph nodes) that are swollen.  Skin rashes.  Film on the throat or tonsils. This can be caused by an infection from bacteria.  Vomiting.  Red, itchy eyes.  Loss of appetite.  Joint pain and muscle aches.  Tonsils that are  temporarily bigger than usual (enlarged).  How is this treated?  Many times, treatment is not needed. This condition usually gets better in 3-4 days without treatment.  If the infection is caused by a bacteria, you may be need to take antibiotics.  Follow these instructions at home:  Medicines  Take over-the-counter and prescription medicines only as told by your doctor.  If you were prescribed an antibiotic medicine, take it as told by your doctor. Do not stop taking the antibiotic even if you start to feel better.  Use throat lozenges or sprays to soothe your throat as told by your doctor.  Children can get pharyngitis. Do not give your child aspirin.  Managing pain  To help with pain, try:  Sipping warm liquids, such as:  Broth.  Herbal tea.  Warm water.  Eating or drinking cold or frozen liquids, such as frozen ice pops.  Rinsing your mouth (gargle) with a salt water mixture 3-4 times a day or as needed.  To make salt water, dissolve ½-1 tsp (3-6 g) of salt in 1 cup (237 mL) of warm water.  Do not swallow this mixture.  Sucking on hard candy or throat lozenges.  Putting a cool-mist humidifier in your bedroom at night to moisten the air.  Sitting in the bathroom with the door closed for 5-10 minutes while you run hot water in the shower.     General instructions    Do not smoke or use any products that contain nicotine or tobacco. If you need help quitting, ask your doctor.  Rest as told by your doctor.  Drink enough fluid to keep your pee (urine) pale yellow.  How is this prevented?  Wash your hands often for at least 20 seconds with soap and water. If soap and water are not available, use hand .  Do not touch your eyes, nose, or mouth with unwashed hands. Wash hands after touching these areas.  Do not share cups or eating utensils.  Avoid close contact with people who are sick.  Contact a doctor if:  You have large, tender lumps in your neck.  You have a rash.  You cough up green, yellow-brown, or  bloody spit.  Get help right away if:  You have a stiff neck.  You drool or cannot swallow liquids.  You cannot drink or take medicines without vomiting.  You have very bad pain that does not go away with medicine.  You have problems breathing, and it is not from a stuffy nose.  You have new pain and swelling in your knees, ankles, wrists, or elbows.  These symptoms may be an emergency. Get help right away. Call your local emergency services (911 in the [place]  Do not wait to see if the symptoms will go away.  Do not drive yourself to the hospital.  Summary  Pharyngitis is a sore throat (pharynx). This is when there is redness, pain, and swelling in your throat.  Most of the time, pharyngitis gets better on its own. Sometimes, you may need medicine.  If you were prescribed an antibiotic medicine, take it as told by your doctor. Do not stop taking the antibiotic even if you start to feel better.  This information is not intended to replace advice given to you by your health care provider. Make sure you discuss any questions you have with your health care provider.  Document Revised: 03/16/2022 Document Reviewed: 03/16/2022  Elsevier Patient Education © 2024 [employer]

## 2025-03-26 ENCOUNTER — HOSPITAL ENCOUNTER (OUTPATIENT)
Dept: ULTRASOUND IMAGING | Facility: HOSPITAL | Age: 35
Discharge: HOME | End: 2025-03-26
Payer: COMMERCIAL

## 2025-03-26 DIAGNOSIS — R59.0 ADENOPATHY, CERVICAL: ICD-10-CM

## 2025-03-26 LAB
FERRITIN SERPL-MCNC: 39.5 NG/ML (ref 13–150)
IRON SATN MFR SERPL: 24 % (ref 20–50)
IRON SERPL-MCNC: 88 MCG/DL (ref 37–145)
TIBC SERPL-MCNC: 361 MCG/DL
UIBC SERPL-MCNC: 273 MCG/DL (ref 112–346)

## 2025-03-26 PROCEDURE — 76536 US EXAM OF HEAD AND NECK: CPT

## 2025-04-06 NOTE — PATIENT INSTRUCTIONS
Lymphadenopathy    Lymphadenopathy is when your lymph glands are swollen or larger than normal.   Lymph glands, also called lymph nodes, are clumps of tissue. They filter germs and waste from tissues in your body to your bloodstream. They're part of your body's defense system, or immune system.  Lymphadenopathy has different causes, like infection, autoimmune disease, and cancer. Lymphadenopathy can happen wherever you have lymph nodes. The type you have depends on which nodes it's in, such as:  Cervical lymphadenopathy. This is in the neck.  Mediastinal lymphadenopathy. This is in the chest.  Hilar lymphadenopathy. This is in the lungs.  Axillary lymphadenopathy. This is in the armpits.  Inguinal lymphadenopathy. This is in the groin.  Sometimes, fluid and cells that fight infection build up in your lymph nodes. This happens when your immune system reacts to germs or other substances that get into your body. This makes lymph nodes swell and get bigger.  Treatment is based on what's thought to be the cause. Sometimes, lymph nodes don't go back to normal size after treatment. If yours don't, your health care provider may order tests to help learn why your glands are still swollen and big.  Follow these instructions at home:    Take over-the-counter and prescription medicines only as told by your provider.  If you were prescribed antibiotics, do not stop using them, even if you start to feel better.  If told, apply heat to swollen lymph nodes as told by your provider. Use the heat source that your provider recommends, such as a moist heat pack or a heating pad.  Place a towel between your skin and the heat source.  Leave the heat on only for the time told by your provider to avoid injury.  If your skin turns bright red, remove the heat right away to prevent burns. The risk of burns is higher if you cannot feel pain, heat, or cold.  Check your swollen lymph nodes every day for changes. Check other places where you have  lymph nodes as told. Check for changes such as:  More swelling.  Sudden growth in size.  Redness or pain.  Hardness.  Contact a health care provider if:  You have lymph nodes that:  Are still swollen after 2 weeks.  Have gotten bigger all of a sudden or the swelling spreads.  Are red, painful, or hard.  Fluid leaks from the skin near a swollen lymph node.  You get a fever, chills, or night sweats.  You feel tired.  You have a sore throat.  Your abdomen hurts.  You lose weight without trying.  This information is not intended to replace advice given to you by your health care provider. Make sure you discuss any questions you have with your health care provider.  Document Revised: 03/13/2024 Document Reviewed: 03/13/2024  Elsevier Patient Education © 2024 [employer]

## 2025-04-08 ENCOUNTER — TELEPHONE (OUTPATIENT)
Dept: INTERNAL MEDICINE | Facility: CLINIC | Age: 35
End: 2025-04-08

## 2025-04-08 NOTE — PATIENT INSTRUCTIONS
Pharyngitis    Pharyngitis is a sore throat (pharynx). This is when there is redness, pain, and swelling in your throat. Most of the time, this condition gets better on its own. In some cases, you may need medicine.  What are the causes?  An infection from a virus.  An infection from bacteria.  Allergies.  What increases the risk?  Being 5-24 years old.  Being in crowded environments. These include:  Daycares.  Schools.  Dormitories.  Living in a place with cold temperatures outside.  Having a weakened disease-fighting (immune) system.  What are the signs or symptoms?  Symptoms may vary depending on the cause. Common symptoms include:  Sore throat.  Tiredness (fatigue).  Low-grade fever.  Stuffy nose.  Cough.  Headache.  Other symptoms may include:  Glands in the neck (lymph nodes) that are swollen.  Skin rashes.  Film on the throat or tonsils. This can be caused by an infection from bacteria.  Vomiting.  Red, itchy eyes.  Loss of appetite.  Joint pain and muscle aches.  Tonsils that are temporarily bigger than usual (enlarged).  How is this treated?  Many times, treatment is not needed. This condition usually gets better in 3-4 days without treatment.  If the infection is caused by a bacteria, you may be need to take antibiotics.  Follow these instructions at home:  Medicines  Take over-the-counter and prescription medicines only as told by your doctor.  If you were prescribed an antibiotic medicine, take it as told by your doctor. Do not stop taking the antibiotic even if you start to feel better.  Use throat lozenges or sprays to soothe your throat as told by your doctor.  Children can get pharyngitis. Do not give your child aspirin.  Managing pain  To help with pain, try:  Sipping warm liquids, such as:  Broth.  Herbal tea.  Warm water.  Eating or drinking cold or frozen liquids, such as frozen ice pops.  Rinsing your mouth (gargle) with a salt water mixture 3-4 times a day or as needed.  To make salt water,  dissolve ½-1 tsp (3-6 g) of salt in 1 cup (237 mL) of warm water.  Do not swallow this mixture.  Sucking on hard candy or throat lozenges.  Putting a cool-mist humidifier in your bedroom at night to moisten the air.  Sitting in the bathroom with the door closed for 5-10 minutes while you run hot water in the shower.     General instructions    Do not smoke or use any products that contain nicotine or tobacco. If you need help quitting, ask your doctor.  Rest as told by your doctor.  Drink enough fluid to keep your pee (urine) pale yellow.  How is this prevented?  Wash your hands often for at least 20 seconds with soap and water. If soap and water are not available, use hand .  Do not touch your eyes, nose, or mouth with unwashed hands. Wash hands after touching these areas.  Do not share cups or eating utensils.  Avoid close contact with people who are sick.  Contact a doctor if:  You have large, tender lumps in your neck.  You have a rash.  You cough up green, yellow-brown, or bloody spit.  Get help right away if:  You have a stiff neck.  You drool or cannot swallow liquids.  You cannot drink or take medicines without vomiting.  You have very bad pain that does not go away with medicine.  You have problems breathing, and it is not from a stuffy nose.  You have new pain and swelling in your knees, ankles, wrists, or elbows.  These symptoms may be an emergency. Get help right away. Call your local emergency services (911 in the [place]  Do not wait to see if the symptoms will go away.  Do not drive yourself to the hospital.  Summary  Pharyngitis is a sore throat (pharynx). This is when there is redness, pain, and swelling in your throat.  Most of the time, pharyngitis gets better on its own. Sometimes, you may need medicine.  If you were prescribed an antibiotic medicine, take it as told by your doctor. Do not stop taking the antibiotic even if you start to feel better.  This information is not intended to  replace advice given to you by your health care provider. Make sure you discuss any questions you have with your health care provider.  Document Revised: 03/16/2022 Document Reviewed: 03/16/2022  Elsevier Patient Education © 2024 [employer] Inc[employer]

## 2025-04-08 NOTE — TELEPHONE ENCOUNTER
Caller: Marian Saeed    Relationship: Self    Best call back number: [phone]    Who are you requesting to speak with (clinical staff, provider,  specific staff member): CLINICAL STAFF    What was the call regarding: STATED THAT THEY ARE NEEDING TO SEE ABOUT GETTING A CALL FROM [employer] IF SHE CAN ABOUT SOME FOLLOW UP ON A RECENT APPOINTMENT THEY HAD AND TO DISCUSS SOME RESULTS OF A TEST AS WELL. STATED THAT THEY WERE EXPECTING TO HEAR FROM HER AND HAVE NOT SINCE THEY HAVE GOTTEN THE RESULTS ON [employer]. PLEASE CALL AND ADVISE

## 2025-05-02 DIAGNOSIS — N76.1 SUBACUTE VAGINITIS: Primary | ICD-10-CM

## 2025-05-02 RX ORDER — FLUCONAZOLE 150 MG/1
150 TABLET ORAL ONCE
Qty: 1 TABLET | Refills: 0 | Status: SHIPPED | OUTPATIENT
Start: 2025-05-02 | End: 2025-05-06

## 2025-05-14 RX ORDER — SUMATRIPTAN SUCCINATE 25 MG/1
TABLET ORAL
Qty: 8 TABLET | Refills: 0 | Status: SHIPPED | OUTPATIENT
Start: 2025-05-14

## 2025-05-14 NOTE — TELEPHONE ENCOUNTER
Rx Refill Note  Requested Prescriptions     Pending Prescriptions Disp Refills    SUMAtriptan (IMITREX) 25 MG tablet       Sig: Take one tablet at onset of headache. May repeat dose one time in 2 hours if headache not relieved.      Last office visit with prescribing clinician: 3/25/2025   Last telemedicine visit with prescribing clinician: Visit date not found   Next office visit with prescribing clinician: 8/25/2025